# Patient Record
Sex: MALE | Race: WHITE | Employment: PART TIME | URBAN - METROPOLITAN AREA
[De-identification: names, ages, dates, MRNs, and addresses within clinical notes are randomized per-mention and may not be internally consistent; named-entity substitution may affect disease eponyms.]

---

## 2018-07-20 ENCOUNTER — HOSPITAL ENCOUNTER (EMERGENCY)
Age: 78
Discharge: HOME | End: 2018-07-20
Attending: EMERGENCY MEDICINE
Payer: MEDICARE

## 2018-07-20 ENCOUNTER — APPOINTMENT (OUTPATIENT)
Dept: GENERAL RADIOLOGY | Age: 78
End: 2018-07-20
Payer: MEDICARE

## 2018-07-20 VITALS
HEIGHT: 68 IN | WEIGHT: 183 LBS | HEART RATE: 63 BPM | RESPIRATION RATE: 14 BRPM | BODY MASS INDEX: 27.74 KG/M2 | SYSTOLIC BLOOD PRESSURE: 137 MMHG | OXYGEN SATURATION: 97 % | TEMPERATURE: 97.9 F | DIASTOLIC BLOOD PRESSURE: 72 MMHG

## 2018-07-20 DIAGNOSIS — M70.22 OLECRANON BURSITIS OF LEFT ELBOW: Primary | ICD-10-CM

## 2018-07-20 PROCEDURE — 99283 EMERGENCY DEPT VISIT LOW MDM: CPT

## 2018-07-20 PROCEDURE — 6370000000 HC RX 637 (ALT 250 FOR IP): Performed by: EMERGENCY MEDICINE

## 2018-07-20 PROCEDURE — 73080 X-RAY EXAM OF ELBOW: CPT

## 2018-07-20 RX ORDER — NAPROXEN 250 MG/1
500 TABLET ORAL ONCE
Status: COMPLETED | OUTPATIENT
Start: 2018-07-20 | End: 2018-07-20

## 2018-07-20 RX ORDER — NAPROXEN 500 MG/1
500 TABLET ORAL 2 TIMES DAILY PRN
Qty: 30 TABLET | Refills: 0 | Status: SHIPPED | OUTPATIENT
Start: 2018-07-20 | End: 2018-12-18 | Stop reason: CLARIF

## 2018-07-20 RX ADMIN — NAPROXEN 500 MG: 250 TABLET ORAL at 21:17

## 2018-07-20 ASSESSMENT — ENCOUNTER SYMPTOMS
DIARRHEA: 0
SINUS PRESSURE: 0
VOMITING: 0
EYE DISCHARGE: 0
BACK PAIN: 0
COUGH: 0
EYE PAIN: 0
EYE REDNESS: 0
ABDOMINAL PAIN: 0
SORE THROAT: 0
NAUSEA: 0
BLOOD IN STOOL: 0
RHINORRHEA: 0
WHEEZING: 0
SHORTNESS OF BREATH: 0

## 2018-07-20 ASSESSMENT — PAIN SCALES - GENERAL
PAINLEVEL_OUTOF10: 1
PAINLEVEL_OUTOF10: 1

## 2018-07-21 NOTE — ED PROVIDER NOTES
round, and reactive to light. Neck: Trachea normal and normal range of motion. Neck supple. No JVD present. Cardiovascular: Normal rate, regular rhythm and normal heart sounds. Exam reveals no gallop. No murmur heard. Pulmonary/Chest: Effort normal and breath sounds normal. No respiratory distress. He has no wheezes. He has no rales. Abdominal: Soft. Bowel sounds are normal. There is no tenderness. There is no rebound, no guarding and no CVA tenderness. Musculoskeletal: He exhibits no edema, tenderness or deformity. Left elbow: He exhibits swelling. He exhibits normal range of motion and no laceration. No tenderness found. Left elbow swelling without erythema, abrasions, or lacerations. Full passive and active left elbow range of motion. No tenderness left upper extremity. Neurological: He is alert and oriented to person, place, and time. He has normal strength. No cranial nerve deficit or sensory deficit. Skin: Skin is warm and dry. Nursing note and vitals reviewed. Procedures    MDM  Number of Diagnoses or Management Options  Olecranon bursitis of left elbow:   Diagnosis management comments: Patient's left elbow swelling was unremarkable for septic arthritis. His left elbow is wrapped with Ace-wrap. In addition, he is provided information and started on medication/Naprosyn for his condition. He is discharged in stable condition with instructions to follow-up with his primary care physician or return to ED if his symptoms worsen. --------------------------------------------- PAST HISTORY ---------------------------------------------  Past Medical History:  has a past medical history of Acute deep vein thrombosis (DVT) of distal vein of right lower extremity ([address] Acute MI, inferior wall ([address] Anxiety; Arthritis; CAD (coronary artery disease); Hip pain; Hyperlipidemia; Hypertension;  Left ventricular dysfunction; Retention of urine, unspecified; and Venous stasis of both lower extremities. Past Surgical History:  has a past surgical history that includes Diagnostic Cardiac Cath Lab Procedure (9/3/08); Coronary angioplasty (9/3/08); joint replacement (2011); Colonoscopy; Rotator cuff repair (3/1/2013); TURP (2012); skin biopsy; and Knee Arthroplasty (12/1/14). Social History:  reports that he has never smoked. He has never used smokeless tobacco. He reports that he does not drink alcohol or use drugs. Family History: family history includes Heart Attack in his father. The patients home medications have been reviewed. Allergies: Adhesive tape    -------------------------------------------------- RESULTS -------------------------------------------------  Labs:  No results found for this visit on 07/20/18. Radiology:  XR ELBOW LEFT (MIN 3 VIEWS)   Final Result   Olecranon bursa bursitis.          ------------------------- NURSING NOTES AND VITALS REVIEWED ---------------------------  Date / Time Roomed:  7/20/2018  8:12 PM  ED Bed Assignment:  21/21    The nursing notes within the ED encounter and vital signs as below have been reviewed. /72   Pulse 63   Temp 97.9 °F (36.6 °C) (Oral)   Resp 14   Ht 5' 8\" (1.727 m)   Wt 183 lb (83 kg)   SpO2 97%   BMI 27.83 kg/m²   Oxygen Saturation Interpretation: Normal      ------------------------------------------ PROGRESS NOTES ------------------------------------------  9:44 PM  I have spoken with the patient and discussed todays results, in addition to providing specific details for the plan of care and counseling regarding the diagnosis and prognosis. Their questions are answered at this time and they are agreeable with the plan. I discussed at length with them reasons for immediate return here for re evaluation. They will followup with their primary care physician by calling their office tomorrow.       --------------------------------- ADDITIONAL PROVIDER NOTES ---------------------------------  At this time the patient is without objective evidence of an acute process requiring hospitalization or inpatient management. They have remained hemodynamically stable throughout their entire ED visit and are stable for discharge with outpatient follow-up. The plan has been discussed in detail and they are aware of the specific conditions for emergent return, as well as the importance of follow-up. New Prescriptions    NAPROXEN (NAPROSYN) 500 MG TABLET    Take 1 tablet by mouth 2 times daily as needed (swelling or pain)       Diagnosis:  1. Olecranon bursitis of left elbow        Disposition:  Patient's disposition: Discharge to home  Patient's condition is stable.          Abigail Manuel DO  Resident  07/20/18 9741

## 2018-07-21 NOTE — ED PROVIDER NOTES
ED ATTENDING NOTE    I saw and examined the patient. I discussed the history and examination with the resident and agree with the plan of care         HPI: Pt presents with left elbow pain, x 1 day, constant, no radiation, pt denies f/c, ha, cp, sob, cough, ap, n/v/d. Pt denies fall or trauma. Pt is lying in bed in no acute distress. --------------------------------------------- PAST HISTORY ---------------------------------------------  Past Medical History:  has a past medical history of Acute deep vein thrombosis (DVT) of distal vein of right lower extremity ([address] Acute MI, inferior wall ([address] Anxiety; Arthritis; CAD (coronary artery disease); Hip pain; Hyperlipidemia; Hypertension; Left ventricular dysfunction; Retention of urine, unspecified; and Venous stasis of both lower extremities. Past Surgical History:  has a past surgical history that includes Diagnostic Cardiac Cath Lab Procedure (9/3/08); Coronary angioplasty (9/3/08); joint replacement (2011); Colonoscopy; Rotator cuff repair (3/1/2013); TURP (2012); skin biopsy; and Knee Arthroplasty (12/1/14). Social History:  reports that he has never smoked. He has never used smokeless tobacco. He reports that he does not drink alcohol or use drugs. Family History: family history includes Heart Attack in his father. The patients home medications have been reviewed.     Allergies: Adhesive tape    ROS: Review HPI for other details  Details in electronic record may supersede details below    Gen: no chills, fever  Eyes: no discharge, no change vision  ENT: no sore throat, no rhinitis  Cardiac: no chest pain, no palpitations  Resp: no sob, no cough  GI: no abd pain, no nausea, vomiting, or diarrhea  : no dysuria, urgency, change in color  MS: no back pain, (+) left elbow pain, no falls, no trauma   Skin: no rash, no itch  Neuro: no dizziness, headache, no focal paralysis or parasthesia  Psych: no hallucinations, no depression  Heme: no

## 2018-12-18 ENCOUNTER — OFFICE VISIT (OUTPATIENT)
Dept: CARDIOLOGY CLINIC | Age: 78
End: 2018-12-18
Payer: MEDICARE

## 2018-12-18 VITALS
DIASTOLIC BLOOD PRESSURE: 80 MMHG | HEART RATE: 61 BPM | RESPIRATION RATE: 16 BRPM | HEIGHT: 68 IN | BODY MASS INDEX: 28.19 KG/M2 | WEIGHT: 186 LBS | SYSTOLIC BLOOD PRESSURE: 132 MMHG

## 2018-12-18 DIAGNOSIS — E78.00 PURE HYPERCHOLESTEROLEMIA: ICD-10-CM

## 2018-12-18 DIAGNOSIS — Z95.5 S/P PRIMARY ANGIOPLASTY WITH CORONARY STENT: ICD-10-CM

## 2018-12-18 DIAGNOSIS — I25.2 HISTORY OF MI (MYOCARDIAL INFARCTION): ICD-10-CM

## 2018-12-18 DIAGNOSIS — I51.9 LEFT VENTRICULAR DYSFUNCTION: ICD-10-CM

## 2018-12-18 DIAGNOSIS — I25.10 CORONARY ARTERY DISEASE INVOLVING NATIVE CORONARY ARTERY OF NATIVE HEART WITHOUT ANGINA PECTORIS: Primary | ICD-10-CM

## 2018-12-18 PROCEDURE — G8484 FLU IMMUNIZE NO ADMIN: HCPCS | Performed by: INTERNAL MEDICINE

## 2018-12-18 PROCEDURE — 4040F PNEUMOC VAC/ADMIN/RCVD: CPT | Performed by: INTERNAL MEDICINE

## 2018-12-18 PROCEDURE — G8598 ASA/ANTIPLAT THER USED: HCPCS | Performed by: INTERNAL MEDICINE

## 2018-12-18 PROCEDURE — 99214 OFFICE O/P EST MOD 30 MIN: CPT | Performed by: INTERNAL MEDICINE

## 2018-12-18 PROCEDURE — 93000 ELECTROCARDIOGRAM COMPLETE: CPT | Performed by: INTERNAL MEDICINE

## 2018-12-18 PROCEDURE — G8427 DOCREV CUR MEDS BY ELIG CLIN: HCPCS | Performed by: INTERNAL MEDICINE

## 2018-12-18 PROCEDURE — 1123F ACP DISCUSS/DSCN MKR DOCD: CPT | Performed by: INTERNAL MEDICINE

## 2018-12-18 PROCEDURE — 1101F PT FALLS ASSESS-DOCD LE1/YR: CPT | Performed by: INTERNAL MEDICINE

## 2018-12-18 PROCEDURE — 1036F TOBACCO NON-USER: CPT | Performed by: INTERNAL MEDICINE

## 2018-12-18 PROCEDURE — G8419 CALC BMI OUT NRM PARAM NOF/U: HCPCS | Performed by: INTERNAL MEDICINE

## 2018-12-18 RX ORDER — ATORVASTATIN CALCIUM 20 MG/1
20 TABLET, FILM COATED ORAL EVERY EVENING
COMMUNITY

## 2018-12-18 RX ORDER — GABAPENTIN 300 MG/1
300 CAPSULE ORAL DAILY
COMMUNITY
End: 2020-01-20 | Stop reason: CLARIF

## 2018-12-18 NOTE — LETTER
To: Andrey Bravo MD [[id number]]        Re: Simi Barreto 1940          Just a note on Larry Holloway and his pending carpal tunnel surgery. He is at low risk for the planned procedure from a cardiac standpoint. (RCRI class II with 0.9% risk of major adverse cardiac event). If indicated his aspirin can be discontinued preoperatively. Please restart this medication postoperatively when bleeding risk is acceptable. Please contact me if I can be of further assistance in his management.     Sincerely,        Barb Urrutia MD

## 2019-10-04 ENCOUNTER — HOSPITAL ENCOUNTER (OUTPATIENT)
Age: 79
Discharge: HOME | End: 2019-10-06

## 2019-10-04 PROCEDURE — 88302 TISSUE EXAM BY PATHOLOGIST: CPT

## 2020-01-20 ENCOUNTER — OFFICE VISIT (OUTPATIENT)
Dept: CARDIOLOGY CLINIC | Age: 80
End: 2020-01-20
Payer: MEDICARE

## 2020-01-20 VITALS
HEIGHT: 68 IN | DIASTOLIC BLOOD PRESSURE: 70 MMHG | WEIGHT: 184 LBS | HEART RATE: 61 BPM | RESPIRATION RATE: 14 BRPM | SYSTOLIC BLOOD PRESSURE: 132 MMHG | BODY MASS INDEX: 27.89 KG/M2

## 2020-01-20 PROCEDURE — 99213 OFFICE O/P EST LOW 20 MIN: CPT | Performed by: INTERNAL MEDICINE

## 2020-01-20 PROCEDURE — G8417 CALC BMI ABV UP PARAM F/U: HCPCS | Performed by: INTERNAL MEDICINE

## 2020-01-20 PROCEDURE — 93000 ELECTROCARDIOGRAM COMPLETE: CPT | Performed by: INTERNAL MEDICINE

## 2020-01-20 PROCEDURE — 1036F TOBACCO NON-USER: CPT | Performed by: INTERNAL MEDICINE

## 2020-01-20 PROCEDURE — 1123F ACP DISCUSS/DSCN MKR DOCD: CPT | Performed by: INTERNAL MEDICINE

## 2020-01-20 PROCEDURE — 4040F PNEUMOC VAC/ADMIN/RCVD: CPT | Performed by: INTERNAL MEDICINE

## 2020-01-20 PROCEDURE — G8484 FLU IMMUNIZE NO ADMIN: HCPCS | Performed by: INTERNAL MEDICINE

## 2020-01-20 PROCEDURE — G8427 DOCREV CUR MEDS BY ELIG CLIN: HCPCS | Performed by: INTERNAL MEDICINE

## 2020-01-20 RX ORDER — LORAZEPAM 1 MG/1
1 TABLET ORAL EVERY 6 HOURS PRN
COMMUNITY
Start: 2019-10-18

## 2020-01-20 NOTE — PROGRESS NOTES
.  Patient Active Problem List   Diagnosis    History of MI (myocardial infarction) - acute inferior wall    CAD (coronary artery disease)    S/P primary angioplasty with coronary stent - RCA     Left ventricular dysfunction    Hyperlipidemia    Acute deep vein thrombosis (DVT) of distal vein of right lower extremity (HCC)    Venous stasis of both lower extremities       Current Outpatient Medications   Medication Sig Dispense Refill    LORazepam (ATIVAN) 1 MG tablet       atorvastatin (LIPITOR) 20 MG tablet Take 20 mg by mouth daily      aspirin 81 MG tablet Take 81 mg by mouth daily      allopurinol (ZYLOPRIM) 300 MG tablet Take 300 mg by mouth daily       Cholecalciferol (VITAMIN D3) 2000 UNITS CAPS Take 3,000 Units by mouth daily       carvedilol (COREG) 6.25 MG tablet Take 6.25 mg by mouth 2 times daily (with meals). Instructed to take with sip water am of procedure       No current facility-administered medications for this visit. CC:    Patient is seen in follow up for:  1. Coronary artery disease involving native coronary artery of native heart without angina pectoris    2. History of MI (myocardial infarction) - acute inferior wall    3. Left ventricular dysfunction    4. S/P primary angioplasty with coronary stent - RCA     5. Pure hypercholesterolemia        HPI:  Patient is doing well without any specific cardiac problems per history with patient and wife. Patient denies any shortness of breath, chest pain, lightheadedness or dizziness. Patient is tolerating medications well without side effects.     .    ROS:   General: No unusual weight gain, no change in exercise tolerance  Skin: No rash or itching  EENT: No vision changes or nosebleeds  Cardiovascular: No orthopnea or paroxysmal nocturnal dyspnea  Respiratory: No cough or hemoptysis  Gastrointestinal: No hematemesis or recent changes in bowel habits  Genitourinary: No hematuria, urgency or frequency  Musculoskeletal: No muscular extremities 7/14/2016       PHYSICAL EXAM:  CONSTITUTIONAL:  Well developed, well nourished    Vitals:    01/20/20 1045   BP: 132/70   Pulse: 61   Resp: 14   Weight: 184 lb (83.5 kg)   Height: 5' 8\" (1.727 m)     HEAD & FACE: Normocephalic. Symmetric. EYES: No xanthelasma. Conjunctivae not injected. EARS, NOSE, MOUTH & THROAT: Good dentition. No oral pallor or cyanosis. NECK: No JVD at 30 degrees. No thyromegaly. RESPIRATORY: Clear to auscultation and percussion in all fields. No use of accessory muscle or intercostal retractions. CARDIOVASCULAR: Regular rate and rhythm. No lifts or thrills on palpitation. Auscultation with normal S1-S2 in intensity and splitting. No carotid bruits. Abdominal aorta not enlarged. Femoral arteries without bruits. Pedal pulses 2+. No edema. ABDOMEN: Soft without hepatic or splenic enlargement. No tenderness. MUSCULOSKELETAL: No kyphosis or scoliosis of the back. Good muscle strength and tone. No muscle atrophy. Normal gait and ability to undergo exercise stress testing. EXTREMITIES: No clubbing or cyanosis. SKIN: No Xanthomas or ulcerations. NEUROLOGIC: Oriented to time, place and person. Normal mood and affect. LYMPHATIC:  No palpable neck or supraclavicular nodes. No splenomegaly. EKG: Normal sinus rhythm. No change compared to prior tracing. ASSESSMENT:                                                     ORDERS:       Diagnosis Orders   1. Coronary artery disease involving native coronary artery of native heart without angina pectoris  EKG 12 lead   2. History of MI (myocardial infarction) - acute inferior wall     3. Left ventricular dysfunction     4. S/P primary angioplasty with coronary stent - RCA      5. Pure hypercholesterolemia       Above assessment cardiac issues stable. PLAN:   See above orders.  Reviewed prior records and testing:     Obtain copy of most recent lipid profile  Discussed issues that would prompt earlier

## 2020-06-01 ENCOUNTER — HOSPITAL ENCOUNTER (OUTPATIENT)
Age: 80
Discharge: HOME | End: 2020-06-03
Payer: MEDICARE

## 2020-06-01 PROCEDURE — 82365 CALCULUS SPECTROSCOPY: CPT

## 2020-06-01 PROCEDURE — 88300 SURGICAL PATH GROSS: CPT

## 2020-06-06 LAB
CALCULI COMPOSITION: NORMAL
MASS: 149 MG
STONE DESCRIPTION: NORMAL
STONE NUMBER: 1
STONE SIZE: NORMAL MM

## 2020-07-06 ENCOUNTER — HOSPITAL ENCOUNTER (OUTPATIENT)
Age: 80
Discharge: HOME | End: 2020-07-08

## 2020-07-06 PROCEDURE — 88302 TISSUE EXAM BY PATHOLOGIST: CPT

## 2021-02-01 ENCOUNTER — OFFICE VISIT (OUTPATIENT)
Dept: CARDIOLOGY CLINIC | Age: 81
End: 2021-02-01
Payer: MEDICARE

## 2021-02-01 VITALS
HEART RATE: 71 BPM | WEIGHT: 185.3 LBS | BODY MASS INDEX: 28.08 KG/M2 | HEIGHT: 68 IN | RESPIRATION RATE: 16 BRPM | DIASTOLIC BLOOD PRESSURE: 60 MMHG | SYSTOLIC BLOOD PRESSURE: 100 MMHG

## 2021-02-01 DIAGNOSIS — I25.2 HISTORY OF MI (MYOCARDIAL INFARCTION): ICD-10-CM

## 2021-02-01 DIAGNOSIS — Z95.5 S/P PRIMARY ANGIOPLASTY WITH CORONARY STENT: ICD-10-CM

## 2021-02-01 DIAGNOSIS — I51.9 LEFT VENTRICULAR DYSFUNCTION: ICD-10-CM

## 2021-02-01 DIAGNOSIS — I25.10 CORONARY ARTERY DISEASE INVOLVING NATIVE CORONARY ARTERY OF NATIVE HEART WITHOUT ANGINA PECTORIS: Primary | ICD-10-CM

## 2021-02-01 DIAGNOSIS — E78.00 PURE HYPERCHOLESTEROLEMIA: ICD-10-CM

## 2021-02-01 PROCEDURE — G8427 DOCREV CUR MEDS BY ELIG CLIN: HCPCS | Performed by: INTERNAL MEDICINE

## 2021-02-01 PROCEDURE — G8484 FLU IMMUNIZE NO ADMIN: HCPCS | Performed by: INTERNAL MEDICINE

## 2021-02-01 PROCEDURE — 1123F ACP DISCUSS/DSCN MKR DOCD: CPT | Performed by: INTERNAL MEDICINE

## 2021-02-01 PROCEDURE — 99213 OFFICE O/P EST LOW 20 MIN: CPT | Performed by: INTERNAL MEDICINE

## 2021-02-01 PROCEDURE — 1036F TOBACCO NON-USER: CPT | Performed by: INTERNAL MEDICINE

## 2021-02-01 PROCEDURE — G8417 CALC BMI ABV UP PARAM F/U: HCPCS | Performed by: INTERNAL MEDICINE

## 2021-02-01 PROCEDURE — 93000 ELECTROCARDIOGRAM COMPLETE: CPT | Performed by: INTERNAL MEDICINE

## 2021-02-01 PROCEDURE — 4040F PNEUMOC VAC/ADMIN/RCVD: CPT | Performed by: INTERNAL MEDICINE

## 2021-02-01 NOTE — PROGRESS NOTES
.  Patient Active Problem List   Diagnosis    History of MI (myocardial infarction) - acute inferior wall    CAD (coronary artery disease)    S/P primary angioplasty with coronary stent - RCA     Left ventricular dysfunction    Hyperlipidemia    Acute deep vein thrombosis (DVT) of distal vein of right lower extremity (HCC)    Venous stasis of both lower extremities       Current Outpatient Medications   Medication Sig Dispense Refill    LORazepam (ATIVAN) 1 MG tablet       atorvastatin (LIPITOR) 20 MG tablet Take 20 mg by mouth daily      aspirin 81 MG tablet Take 81 mg by mouth daily      allopurinol (ZYLOPRIM) 300 MG tablet Take 300 mg by mouth daily       Cholecalciferol (VITAMIN D3) 75 MCG (3000 UT) TABS Take 3,000 Units by mouth daily       carvedilol (COREG) 6.25 MG tablet Take 6.25 mg by mouth 2 times daily (with meals). Instructed to take with sip water am of procedure       No current facility-administered medications for this visit. CC:    Patient is seen in follow up for:  1. Coronary artery disease involving native coronary artery of native heart without angina pectoris    2. History of MI (myocardial infarction) - acute inferior wall    3. Left ventricular dysfunction    4. S/P primary angioplasty with coronary stent - RCA     5. Pure hypercholesterolemia        HPI:  Patient is doing well without any specific cardiac problems per history with patient and wife. Patient denies any shortness of breath, chest pain, lightheadedness or dizziness. Patient is tolerating medications well without side effects.     .    ROS:   General: No unusual weight gain, no change in exercise tolerance  Skin: No rash or itching  EENT: No vision changes or nosebleeds  Cardiovascular: No orthopnea or paroxysmal nocturnal dyspnea  Respiratory: No cough or hemoptysis  Gastrointestinal: No hematemesis or recent changes in bowel habits  Genitourinary: No hematuria, urgency or frequency Musculoskeletal: No muscular weakness or joint swelling   Neurologic / Psychiatric: No incoordination or convulsions  Allergic / Immunologic/ Lymphatic / Endocrine: No anemia or bleeding tendency    Social History     Socioeconomic History    Marital status:      Spouse name: Not on file    Number of children: Not on file    Years of education: Not on file    Highest education level: Not on file   Occupational History    Not on file   Social Needs    Financial resource strain: Not on file    Food insecurity     Worry: Not on file     Inability: Not on file    Transportation needs     Medical: Not on file     Non-medical: Not on file   Tobacco Use    Smoking status: Never Smoker    Smokeless tobacco: Never Used   Substance and Sexual Activity    Alcohol use: No    Drug use: No    Sexual activity: Not on file   Lifestyle    Physical activity     Days per week: Not on file     Minutes per session: Not on file    Stress: Not on file   Relationships    Social connections     Talks on phone: Not on file     Gets together: Not on file     Attends Bahai service: Not on file     Active member of club or organization: Not on file     Attends meetings of clubs or organizations: Not on file     Relationship status: Not on file    Intimate partner violence     Fear of current or ex partner: Not on file     Emotionally abused: Not on file     Physically abused: Not on file     Forced sexual activity: Not on file   Other Topics Concern    Not on file   Social History Narrative    Not on file       Past Medical History:   Diagnosis Date    Acute deep vein thrombosis (DVT) of distal vein of right lower extremity ([facility] [address] 7/14/2016    Acute MI, inferior wall ([facility] [address] [facility] 9/3/08    Anxiety     Arthritis     osteo    CAD (coronary artery disease)     follows with Dr. Maren Arana    Hip pain     Hyperlipidemia     Hypertension     Left ventricular dysfunction     Retention of urine, unspecified  Venous stasis of both lower extremities 7/14/2016       PHYSICAL EXAM:  CONSTITUTIONAL:  Well developed, well nourished    Vitals:    02/01/21 0809   BP: 100/60   Pulse: 71   Resp: 16   Weight: 185 lb 4.8 oz (84.1 kg)   Height: 5' 8\" (1.727 m)     HEAD & FACE: Normocephalic. Symmetric. EYES: No xanthelasma. Conjunctivae not injected. EARS, NOSE, MOUTH & THROAT: Good dentition. No oral pallor or cyanosis. NECK: No JVD at 30 degrees. No thyromegaly. RESPIRATORY: Clear to auscultation and percussion in all fields. No use of accessory muscle or intercostal retractions. CARDIOVASCULAR: Regular rate and rhythm. No lifts or thrills on palpitation. Auscultation with normal S1-S2 in intensity and splitting. No carotid bruits. Abdominal aorta not enlarged. Femoral arteries without bruits. Pedal pulses 2+. No edema. ABDOMEN: Soft without hepatic or splenic enlargement. No tenderness. MUSCULOSKELETAL: No kyphosis or scoliosis of the back. Good muscle strength and tone. No muscle atrophy. Normal gait and ability to undergo exercise stress testing. EXTREMITIES: No clubbing or cyanosis. SKIN: No Xanthomas or ulcerations. NEUROLOGIC: Oriented to time, place and person. Normal mood and affect. LYMPHATIC:  No palpable neck or supraclavicular nodes. No splenomegaly. EKG: Normal sinus rhythm. No change compared to prior tracing. ASSESSMENT:                                                     ORDERS:       Diagnosis Orders   1. Coronary artery disease involving native coronary artery of native heart without angina pectoris  EKG 12 Lead   2. History of MI (myocardial infarction) - acute inferior wall     3. Left ventricular dysfunction     4. S/P primary angioplasty with coronary stent - RCA      5. Pure hypercholesterolemia       Above assessment cardiac issues stable. PLAN:   See above orders.  Reviewed prior records and testing:     Most recent lipid profile LDL 59 on 6/8/2020 Discussed issues that would prompt earlier evaluation. Follow-up office visit in 1 year.

## 2021-09-13 ENCOUNTER — HOSPITAL ENCOUNTER (OUTPATIENT)
Dept: GENERAL RADIOLOGY | Age: 81
Discharge: HOME | End: 2021-09-15
Payer: MEDICARE

## 2021-09-13 ENCOUNTER — HOSPITAL ENCOUNTER (OUTPATIENT)
Age: 81
Discharge: HOME | End: 2021-09-15
Payer: MEDICARE

## 2021-09-13 DIAGNOSIS — M15.3 POST-TRAUMATIC OSTEOARTHRITIS OF MULTIPLE JOINTS: ICD-10-CM

## 2021-09-13 PROCEDURE — 72100 X-RAY EXAM L-S SPINE 2/3 VWS: CPT

## 2022-07-14 ENCOUNTER — OFFICE VISIT (OUTPATIENT)
Dept: ORTHOPEDIC SURGERY | Age: 82
End: 2022-07-14
Payer: MEDICARE

## 2022-07-14 VITALS — WEIGHT: 185 LBS | BODY MASS INDEX: 28.04 KG/M2 | HEIGHT: 68 IN

## 2022-07-14 DIAGNOSIS — M54.2 NECK PAIN: Primary | ICD-10-CM

## 2022-07-14 DIAGNOSIS — S46.812A STRAIN OF LEFT TRAPEZIUS MUSCLE, INITIAL ENCOUNTER: ICD-10-CM

## 2022-07-14 PROCEDURE — 1123F ACP DISCUSS/DSCN MKR DOCD: CPT | Performed by: PHYSICIAN ASSISTANT

## 2022-07-14 PROCEDURE — 99203 OFFICE O/P NEW LOW 30 MIN: CPT | Performed by: PHYSICIAN ASSISTANT

## 2022-07-14 RX ORDER — TRAMADOL HYDROCHLORIDE 50 MG/1
50 TABLET ORAL EVERY 6 HOURS PRN
COMMUNITY
Start: 2022-05-18

## 2022-07-14 RX ORDER — TIZANIDINE 2 MG/1
2 TABLET ORAL NIGHTLY PRN
Qty: 7 TABLET | Refills: 0 | Status: SHIPPED
Start: 2022-07-14 | End: 2022-07-18 | Stop reason: SINTOL

## 2022-07-14 NOTE — PATIENT INSTRUCTIONS
Patient Education        Neck Pain: Care Instructions  Your Care Instructions     You can have neck pain anywhere from the bottom of your head to the top of your shoulders. It can spread to the upper back or arms. Injuries, painting a ceiling, sleeping with your neck twisted, staying in one position for too long,and many other activities can cause neck pain. Most neck pain gets better with home care. Your doctor may recommend medicine to relieve pain or relax your muscles. He or she may suggest exercise and physical therapy to increase flexibility and relieve stress. You may need towear a special (cervical) collar to support your neck for a day or two. Follow-up care is a key part of your treatment and safety. Be sure to make and go to all appointments, and call your doctor if you are having problems. It's also a good idea to know your test results and keep alist of the medicines you take. How can you care for yourself at home?  Try using a heating pad on a low or medium setting for 15 to 20 minutes every 2 or 3 hours. Try a warm shower in place of one session with the heating pad.  You can also try an ice pack for 10 to 15 minutes every 2 to 3 hours. Put a thin cloth between the ice and your skin.  Take pain medicines exactly as directed. ? If the doctor gave you a prescription medicine for pain, take it as prescribed. ? If you are not taking a prescription pain medicine, ask your doctor if you can take an over-the-counter medicine.  If your doctor recommends a cervical collar, wear it exactly as directed. When should you call for help? Call your doctor now or seek immediate medical care if:     You have new or worsening numbness in your arms, buttocks or legs.      You have new or worsening weakness in your arms or legs. (This could make it hard to stand up.)      You lose control of your bladder or bowels.    Watch closely for changes in your health, and be sure to contact your doctor if:     Your neck pain is getting worse.      You are not getting better after 1 week.      You do not get better as expected. Where can you learn more? Go to https://chpepiceweb.[email or web address]. org and sign in to your [employer] account. Enter [id number] in the [facility] box to learn more about \"Neck Pain: Care Instructions. \"     If you do not have an account, please click on the \"Sign Up Now\" link. Current as of: March 9, 2022               Content Version: 13.3  © 0883-2235 [employer]. Care instructions adapted under license by [facility] ([facility]). If you have questions about a medical condition or this instruction, always ask your healthcare professional. [address] any warranty or liability for your use of this information. Patient Education        Torticollis: Care Instructions  Your Care Instructions  Torticollis is a severe tightness of the muscles on one side of the neck. The tight muscles can make the head turn to one side, lean to one side, or bepulled forward or backward. It is also called wryneck. Your doctor asked questions about your health and examined you. You may also have had X-rays or other tests. If your doctor thinks another medical problemis causing your tight neck muscles, you may need more tests. Torticollis usually gets better with home care. Your doctor may have you take medicine to relieve pain or relax your muscles. He or she may suggest exercise and physical therapy to help increase flexibility and relieve stress. Your doctor may also have you wear a special collar, called a cervical collar, for aday or two. The collar may help make your neck more comfortable. Follow-up care is a key part of your treatment and safety. Be sure to make and go to all appointments, and call your doctor if you are having problems. It's also a good idea to know your test results and keep alist of the medicines you take.   How can you care for yourself at home?   Be safe with medicines. Read and follow all instructions on the label. ? If the doctor gave you a prescription medicine for pain, take it as prescribed. ? If you are not taking a prescription pain medicine, ask your doctor if you can take an over-the-counter medicine.  Try using a heating pad on a low or medium setting for 15 to 20 minutes every 2 or 3 hours. Try a warm shower in place of one session with the heating pad.  Try using an ice pack for 10 to 15 minutes every 2 to 3 hours. Put a thin cloth between the ice and your skin.  If your doctor recommends a cervical collar, wear it exactly as directed. When should you call for help? Call your doctor now or seek immediate medical care if:     You have new or worse numbness in your arms, buttocks, or legs.      You have new or worse weakness in your arms or legs.      Your neck pain gets worse.      You lose bladder or bowel control. Watch closely for changes in your health, and be sure to contact your doctor if:     You do not get better as expected. Where can you learn more? Go to https://IdentiapeCargoGuard.Scanadu. org and sign in to your [employer] account. Enter E750 in the [employer] box to learn more about \"Torticollis: Care Instructions. \"     If you do not have an account, please click on the \"Sign Up Now\" link. Current as of: March 9, 2022               Content Version: 13.3  © 6988-3194 [employer]. Care instructions adapted under license by [facility] ([facility]). If you have questions about a medical condition or this instruction, always ask your healthcare professional. [place] [address] any warranty or liability for your use of this information.

## 2022-07-14 NOTE — PROGRESS NOTES
[address] [facility],7Th Floor In Care  New Patient Note      CHIEF COMPLAINT:   Chief Complaint   Patient presents with    Neck Pain     Pt presents this AM with c/o L sided neck pain and L shoulder pain. States that he has noticed this pain over the past 2-3 months, but it recently worsened. States that pain begins in L suboccipitals, and extends into UT and shoulder blade. Pt also notes pain in deltoid as well. Has been taking Tylenol at home for the pain. Has been taking Tramadol for another issue as well.  Shoulder Pain       HISTORY OF PRESENT ILLNESS:                The patient is a 81 y.o. male who presents today with with complaints of left-sided neck pain that does radiate down into his left shoulder that has been present for the last 2-3 months with no known mechanism of injury. He localizes pain to the suboccipitals rating down along the trapezius and into the scapula. He denies any numbness, tingling or loss of sensation or function into the hand or fingers. Pain is worse with turning his head to the left as well as right sided tilts, sleep. He denies any difficulty or pain with shoulder range of motion. 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 He has never injured his neck in the past.  He has been taking Tylenol at home for discomfort as well as prescription tramadol.        Past Medical History:        Diagnosis Date    Acute deep vein thrombosis (DVT) of distal vein of right lower extremity ([address] 7/14/2016    Acute MI, inferior wall ([address] 9/3/08    Anxiety     Arthritis     osteo    CAD (coronary artery disease)     follows with Dr. Nick Avila    Hip pain     Hyperlipidemia     Hypertension     Left ventricular dysfunction     Retention of urine, unspecified     Venous stasis of both lower extremities 7/14/2016     Past Surgical History:        Procedure Laterality Date    COLONOSCOPY      CORONARY ANGIOPLASTY  9/3/08    PTCA/stent of the RCA 3.8 x 18 mm [place] stent     DIAGNOSTIC CARDIAC CATH LAB PROCEDURE  9/3/08    [facility]ERN[facility]  11/2019    [facility]     JOINT REPLACEMENT  2011    left knee    KNEE ARTHROPLASTY  12/1/14    right total knee     ROTATOR CUFF REPAIR  3/1/2013    right    SKIN BIOPSY      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 Allergies:  Adhesive tape    Social History:   TOBACCO:   reports that he has never smoked. He has never used smokeless tobacco.  ETOH:   reports no history of alcohol use. DRUGS:   reports no history of drug use. OCCUPATION: [employer]    Review of Systems   Constitutional: Negative for fever, chills, diaphoresis, appetite change and fatigue. HENT: Negative for dental issues, hearing loss and tinnitus. Negative for congestion, sinus pressure, sneezing, sore throat. Negative for headache. Eyes: Negative for visual disturbance, blurred and double vision. Negative for pain, discharge, redness and itching  Respiratory: Negative for cough, shortness of breath and wheezing. Cardiovascular: Negative for chest pain, palpitations and leg swelling. No dyspnea on exertion   Gastrointestinal:   Negative for nausea, vomiting, abdominal pain, diarrhea, constipation  or black or bloody. Hematologic\Lymphatic:  negative for bleeding, petechiae,   Genitourinary: Negative for hematuria and difficulty urinating. Musculoskeletal: Negative for back pain, joint swelling and gait problem. + Left-sided cervical pain  Skin: Negative for pallor, rash and wound. Neurological: Negative for dizziness, tremors, seizures, weakness, light-headedness, no TIA or stroke symptoms. No numbness and headaches. Psychiatric/Behavioral: Negative.      Physical Examination:   General appearance: alert, well appearing, and in no distress,  normal appearing weight  Mental status: alert, oriented to person, place, and time, normal mood, behavior, speech, dress, motor activity, and thought processes  Resp:   resp easy and unlabored, no audible wheezes note  Cardiac: distal pulses palpable, skin well perfused  Neurological: alert, oriented X3, normal speech, no focal findings or movement disorder noted, motor and sensory grossly normal bilaterally, normal muscle tone  HEENT: normochephalic atraumatic, external ears and eyes normal, sclera normal, neck supple  Extremities:   peripheral pulses normal, no edema, redness or tenderness in the calves   Skin: normal coloration, no rashes or open wounds, no suspicious skin lesions noted  Psych: Affect euthymic   Musculoskeletal:   Spine: On visual inspection there is no obvious deformity throughout the spine. There is no erythema, edema, ecchymosis or open wounds. There is no decreased sensation to light touch throughout the spine, bilateral UE or bilateral LE. Pt is neurovascularly intact. Patient is tender to palpation along the trapezius on the left. There is no tenderness to palpation elsewhere throughout the spine. Increased pain with neck flexion, extension, rotation, and lateral tilt to the right.  (+)Apryl     5' 8\" (1.727 m)   Wt 185 lb (83.9 kg)   BMI 28.13 kg/m²      XR: 3 view cervical spine x-rays were obtained in the clinic today which show no evidence of fracture, dislocation. The imaging will be reviewed and interpreted by Radiologist.  The report was not complete at the time of this note. Please refer to Radiologist report for their interpretation. ASSESSMENT:   Diagnosis Orders   1. Neck pain  XR CERVICAL SPINE (2-3 VIEWS)    Amb External Referral To Physical Therapy    Kim [facility], [address], [address], [address] [facility]    tiZANidine (ZANAFLEX) 2 MG tablet   2.  Strain of left trapezius muscle, initial encounter  Amb External Referral To Physical Therapy    [address] [place] [address]    tiZANidine (ZANAFLEX) 2 MG tablet PLAN:  Patient is a pleasant 81-year-old male who presents to the clinic today for evaluation of left-sided neck pain that radiates down into his scapula. This pain is been present for the last 2-3 months and he does not recall any known injury prior to the pain starting. On exam he is tender to palpation and is very tight tracing from the suboccipitals along the trapezius. He does have increased discomfort with neck flexion, extension, rotation and lateral tilt to the right. I did obtain three-view cervical spine x-rays in the clinic today which show no evidence of fracture or dislocation. I did explain to him I think this is all muscular. He is already taking Tylenol and tramadol. I did recommend Zanaflex 2 mg 1 tablet at bedtime as needed for spasm for no more than 7 days. I did advise him he should only use this at bedtime as it may make him dizzy or sleepy and he should not drive while taking it. I also recommended physical therapy. We did also discuss chiropractic care, however he is in a hold off and try physical therapy first.  I did put a referral in in case he decides that he does not want to try chiropractic care. He can use heat or ice 20 minutes on 20 minutes off whenever feels better. If he develops any numbness tingling loss of sensation or function down into his fingertips he does need to call the office emergently. Patient voiced understanding and agrees with treatment plan outlined for the office today. If he has additional questions or concerns he will call the office or return to the clinic for further evaluation. Otherwise, more than happy to see him back on an as-needed basis. Electronically signed by Lila Cameron PA-C on 7/14/22 at 8:04 AM EDT    **This report was transcribed using voice recognition software. Every effort was made to ensure accuracy; however, inadvertent computerized transcription errors may be present. **

## 2022-07-18 ENCOUNTER — OFFICE VISIT (OUTPATIENT)
Dept: NEUROSURGERY | Age: 82
End: 2022-07-18
Payer: MEDICARE

## 2022-07-18 VITALS
HEART RATE: 81 BPM | HEIGHT: 68 IN | DIASTOLIC BLOOD PRESSURE: 82 MMHG | WEIGHT: 175 LBS | BODY MASS INDEX: 26.52 KG/M2 | SYSTOLIC BLOOD PRESSURE: 138 MMHG

## 2022-07-18 DIAGNOSIS — M48.061 SPINAL STENOSIS OF LUMBAR REGION WITHOUT NEUROGENIC CLAUDICATION: Primary | ICD-10-CM

## 2022-07-18 PROCEDURE — 99203 OFFICE O/P NEW LOW 30 MIN: CPT | Performed by: PHYSICIAN ASSISTANT

## 2022-07-18 PROCEDURE — 1036F TOBACCO NON-USER: CPT | Performed by: PHYSICIAN ASSISTANT

## 2022-07-18 PROCEDURE — 1123F ACP DISCUSS/DSCN MKR DOCD: CPT | Performed by: PHYSICIAN ASSISTANT

## 2022-07-18 PROCEDURE — G8427 DOCREV CUR MEDS BY ELIG CLIN: HCPCS | Performed by: PHYSICIAN ASSISTANT

## 2022-07-18 PROCEDURE — G8417 CALC BMI ABV UP PARAM F/U: HCPCS | Performed by: PHYSICIAN ASSISTANT

## 2022-07-18 RX ORDER — GABAPENTIN 300 MG/1
300 CAPSULE ORAL 3 TIMES DAILY
Qty: 90 CAPSULE | Refills: 3 | Status: SHIPPED | OUTPATIENT
Start: 2022-07-18 | End: 2022-09-21

## 2022-07-18 ASSESSMENT — ENCOUNTER SYMPTOMS
RESPIRATORY NEGATIVE: 1
BACK PAIN: 1
ALLERGIC/IMMUNOLOGIC NEGATIVE: 1
GASTROINTESTINAL NEGATIVE: 1
EYES NEGATIVE: 1

## 2022-07-18 NOTE — PROGRESS NOTES
Subjective:      Patient ID: Keisha Keene is a 81 y.o. male. Back Pain  This is a new problem. Episode onset: 8 months. The pain is present in the lumbar spine (and left > right leg pain into the feet. ). The pain is at a severity of 8/10. The symptoms are aggravated by twisting, standing and bending. Treatments tried: tramadol, tylenol, FANNIE. The treatment provided mild relief. Review of Systems   Constitutional: Negative. HENT: Negative. Eyes: Negative. Respiratory: Negative. Cardiovascular: Negative. Gastrointestinal: Negative. Endocrine: Negative. Genitourinary: Negative. Musculoskeletal:  Positive for back pain. Skin: Negative. Allergic/Immunologic: Negative. Neurological: Negative. Hematological: Negative. Psychiatric/Behavioral: Negative. Objective:   Physical Exam  Constitutional:       Appearance: Normal appearance. HENT:      Head: Normocephalic and atraumatic. Nose: Nose normal.   Eyes:      Pupils: Pupils are equal, round, and reactive to light. Pulmonary:      Effort: Pulmonary effort is normal.   Abdominal:      General: There is no distension. Skin:     General: Skin is warm and dry. Neurological:      Mental Status: He is alert. GCS: GCS eye subscore is 4. GCS verbal subscore is 5. GCS motor subscore is 6. Cranial Nerves: Cranial nerves are intact. Sensory: Sensation is intact. Motor: Motor function is intact. Gait: Gait abnormal.      Deep Tendon Reflexes:      Reflex Scores:       Patellar reflexes are 0 on the right side and 0 on the left side. Achilles reflexes are 1+ on the right side and 1+ on the left side. Psychiatric:         Mood and Affect: Mood normal.       Assessment:      81year old male with progressive left > right leg pain into the feet. Lumbar MRI reveals severe L3-4, and L4-5 stenosis, normal alignment. He has had no relief with PT, FANNIE, and NSAIDS. Plan:       We will order

## 2022-09-21 ENCOUNTER — OFFICE VISIT (OUTPATIENT)
Dept: CARDIOLOGY CLINIC | Age: 82
End: 2022-09-21
Payer: MEDICARE

## 2022-09-21 VITALS
HEIGHT: 68 IN | BODY MASS INDEX: 26.52 KG/M2 | DIASTOLIC BLOOD PRESSURE: 70 MMHG | WEIGHT: 175 LBS | HEART RATE: 73 BPM | RESPIRATION RATE: 12 BRPM | SYSTOLIC BLOOD PRESSURE: 142 MMHG

## 2022-09-21 DIAGNOSIS — I25.2 HISTORY OF MI (MYOCARDIAL INFARCTION): ICD-10-CM

## 2022-09-21 DIAGNOSIS — I51.9 LEFT VENTRICULAR DYSFUNCTION: ICD-10-CM

## 2022-09-21 DIAGNOSIS — I25.10 CORONARY ARTERY DISEASE INVOLVING NATIVE CORONARY ARTERY OF NATIVE HEART WITHOUT ANGINA PECTORIS: Primary | ICD-10-CM

## 2022-09-21 DIAGNOSIS — E78.00 PURE HYPERCHOLESTEROLEMIA: ICD-10-CM

## 2022-09-21 DIAGNOSIS — Z95.5 S/P PRIMARY ANGIOPLASTY WITH CORONARY STENT: ICD-10-CM

## 2022-09-21 PROCEDURE — 1036F TOBACCO NON-USER: CPT | Performed by: INTERNAL MEDICINE

## 2022-09-21 PROCEDURE — 1123F ACP DISCUSS/DSCN MKR DOCD: CPT | Performed by: INTERNAL MEDICINE

## 2022-09-21 PROCEDURE — G8417 CALC BMI ABV UP PARAM F/U: HCPCS | Performed by: INTERNAL MEDICINE

## 2022-09-21 PROCEDURE — G8427 DOCREV CUR MEDS BY ELIG CLIN: HCPCS | Performed by: INTERNAL MEDICINE

## 2022-09-21 PROCEDURE — 99213 OFFICE O/P EST LOW 20 MIN: CPT | Performed by: INTERNAL MEDICINE

## 2022-09-21 PROCEDURE — 93000 ELECTROCARDIOGRAM COMPLETE: CPT | Performed by: INTERNAL MEDICINE

## 2022-09-21 RX ORDER — METHYLPREDNISOLONE 4 MG/1
TABLET ORAL
COMMUNITY
Start: 2022-08-05 | End: 2022-11-03

## 2022-09-21 NOTE — PROGRESS NOTES
.  Patient Active Problem List   Diagnosis    History of MI (myocardial infarction) - acute inferior wall    CAD (coronary artery disease)    S/P primary angioplasty with coronary stent - RCA     Left ventricular dysfunction    Hyperlipidemia    Acute deep vein thrombosis (DVT) of distal vein of right lower extremity (HCC)    Venous stasis of both lower extremities       Current Outpatient Medications   Medication Sig Dispense Refill    methylPREDNISolone (MEDROL DOSEPACK) 4 MG tablet       gabapentin (NEURONTIN) 300 MG capsule Take 1 capsule by mouth in the morning and 1 capsule at noon and 1 capsule before bedtime. Do all this for 30 days. 90 capsule 3    traMADol (ULTRAM) 50 MG tablet       LORazepam (ATIVAN) 1 MG tablet       atorvastatin (LIPITOR) 20 MG tablet Take 20 mg by mouth in the morning. aspirin 81 MG tablet Take 81 mg by mouth daily      allopurinol (ZYLOPRIM) 300 MG tablet Take 300 mg by mouth daily       Cholecalciferol (VITAMIN D3) 75 MCG (3000 UT) TABS Take 3,000 Units by mouth daily       carvedilol (COREG) 6.25 MG tablet Take 6.25 mg by mouth 2 times daily (with meals). Instructed to take with sip water am of procedure       No current facility-administered medications for this visit. CC:    Patient is seen in follow up for:  1. Coronary artery disease involving native coronary artery of native heart without angina pectoris    2. History of MI (myocardial infarction) - acute inferior wall    3. Left ventricular dysfunction    4. S/P primary angioplasty with coronary stent - RCA     5. Pure hypercholesterolemia        HPI:  Patient is doing well without any specific cardiac problems. Patient denies any shortness of breath, chest pain, lightheadedness or dizziness. Patient is tolerating medications well without side effects. Considering surgery for lumbar spinal stenosis with neurogenic claudication.     .    ROS:   General: No unusual weight gain, no change in exercise tolerance  Skin: No rash or itching  EENT: No vision changes or nosebleeds  Cardiovascular: No orthopnea or paroxysmal nocturnal dyspnea  Respiratory: No cough or hemoptysis  Gastrointestinal: No hematemesis or recent changes in bowel habits  Genitourinary: No hematuria, urgency or frequency  Musculoskeletal: No muscular weakness or joint swelling   Neurologic / Psychiatric: No incoordination or convulsions  Allergic / Immunologic/ Lymphatic / Endocrine: No anemia or bleeding tendency    Social History     Socioeconomic History    Marital status:      Spouse name: Not on file    Number of children: Not on file    Years of education: Not on file    Highest education level: Not on file   Occupational History    Not on file   Tobacco Use    Smoking status: Never    Smokeless tobacco: Never   Substance and Sexual Activity    Alcohol use: No    Drug use: No    Sexual activity: Not on file   Other Topics Concern    Not on file   Social History Narrative    Not on file     Social Determinants of Health     Financial Resource Strain: Not on file   Food Insecurity: Not on file   Transportation Needs: Not on file   Physical Activity: Not on file   Stress: Not on file   Social Connections: Not on file   Intimate Partner Violence: Not on file   Housing Stability: Not on file       Past Medical History:   Diagnosis Date    Acute deep vein thrombosis (DVT) of distal vein of right lower extremity ([facility] 7/14/2016    Acute MI, inferior wall ([facility] 9/3/08    Anxiety     Arthritis     osteo    CAD (coronary artery disease)     follows with Dr. Sherri Olmstead    Hip pain     Hyperlipidemia     Hypertension     Left ventricular dysfunction     Retention of urine, unspecified     Venous stasis of both lower extremities 7/14/2016       PHYSICAL EXAM:  CONSTITUTIONAL:  Well developed, well nourished    Vitals:    09/21/22 0751   BP: (!) 142/70   Pulse: 73   Resp: 12   Weight: 175 lb (79.4 kg)   Height: 5' 8\" (1.727 m)     HEAD & FACE: Normocephalic. Symmetric. EYES: No xanthelasma. Conjunctivae not injected. EARS, NOSE, MOUTH & THROAT: Good dentition. No oral pallor or cyanosis. NECK: No JVD at 30 degrees. No thyromegaly. RESPIRATORY: Clear to auscultation and percussion in all fields. No use of accessory muscle or intercostal retractions. CARDIOVASCULAR: Regular rate and rhythm. No lifts or thrills on palpitation. Auscultation with normal S1-S2 in intensity and splitting. No carotid bruits. Abdominal aorta not enlarged. Femoral arteries without bruits. Pedal pulses 2+. No edema. ABDOMEN: Soft without hepatic or splenic enlargement. No tenderness. MUSCULOSKELETAL: No kyphosis or scoliosis of the back. Good muscle strength and tone. No muscle atrophy. Normal gait and ability to undergo exercise stress testing. EXTREMITIES: No clubbing or cyanosis. SKIN: No Xanthomas or ulcerations. NEUROLOGIC: Oriented to time, place and person. Normal mood and affect. LYMPHATIC:  No palpable neck or supraclavicular nodes. No splenomegaly. EKG: Normal sinus rhythm. Poor septal R wave progression. QTc 436 ms. No change compared to prior tracing. ASSESSMENT:                                                     ORDERS:       Diagnosis Orders   1. Coronary artery disease involving native coronary artery of native heart without angina pectoris  EKG 12 lead      2. History of MI (myocardial infarction) - acute inferior wall  EKG 12 lead      3. Left ventricular dysfunction        4. S/P primary angioplasty with coronary stent - RCA   EKG 12 lead      5. Pure hypercholesterolemia          Above assessment cardiac issues stable. PLAN:   See above orders. Reviewed prior records and testing:     Most recent lipid profile LDL 59 on 6/8/2020  Discussed issues that would prompt earlier evaluation. Follow-up office visit in 1 year. Consider stress testing if patient decides to proceed with spinal surgery.

## 2022-10-12 ENCOUNTER — TELEPHONE (OUTPATIENT)
Dept: CARDIOLOGY CLINIC | Age: 82
End: 2022-10-12

## 2022-10-12 NOTE — TELEPHONE ENCOUNTER
Patient called and stated he is going to move forward with surgery.   He needs stress testing,  Please advise on what stress test to order

## 2022-10-13 DIAGNOSIS — Z01.818 PRE-OP TESTING: Primary | ICD-10-CM

## 2022-10-13 DIAGNOSIS — I25.10 CORONARY ARTERY DISEASE INVOLVING NATIVE CORONARY ARTERY OF NATIVE HEART WITHOUT ANGINA PECTORIS: ICD-10-CM

## 2022-10-17 ENCOUNTER — TELEPHONE (OUTPATIENT)
Dept: CARDIOLOGY | Age: 82
End: 2022-10-17

## 2022-10-17 NOTE — TELEPHONE ENCOUNTER
Left message on voice mail to remind patient of pharmacological  stress test appointment on 10/19/2022 at 0900. Instructions for test,given, and COVID-19 preprocedure information left on voice mail. Asked patient to call with any questions or if unable to keep appointment.

## 2022-10-19 ENCOUNTER — HOSPITAL ENCOUNTER (OUTPATIENT)
Dept: CARDIOLOGY | Age: 82
Discharge: HOME | End: 2022-10-19
Payer: MEDICARE

## 2022-10-19 VITALS
WEIGHT: 175 LBS | HEART RATE: 60 BPM | SYSTOLIC BLOOD PRESSURE: 130 MMHG | HEIGHT: 68 IN | RESPIRATION RATE: 18 BRPM | DIASTOLIC BLOOD PRESSURE: 70 MMHG | BODY MASS INDEX: 26.52 KG/M2

## 2022-10-19 DIAGNOSIS — I25.10 CORONARY ARTERY DISEASE INVOLVING NATIVE CORONARY ARTERY OF NATIVE HEART WITHOUT ANGINA PECTORIS: ICD-10-CM

## 2022-10-19 DIAGNOSIS — Z01.818 PRE-OP TESTING: ICD-10-CM

## 2022-10-19 PROCEDURE — 2580000003 HC RX 258: Performed by: INTERNAL MEDICINE

## 2022-10-19 PROCEDURE — 93017 CV STRESS TEST TRACING ONLY: CPT

## 2022-10-19 PROCEDURE — A9500 TC99M SESTAMIBI: HCPCS | Performed by: INTERNAL MEDICINE

## 2022-10-19 PROCEDURE — 6360000002 HC RX W HCPCS: Performed by: INTERNAL MEDICINE

## 2022-10-19 PROCEDURE — 78452 HT MUSCLE IMAGE SPECT MULT: CPT

## 2022-10-19 PROCEDURE — 3430000000 HC RX DIAGNOSTIC RADIOPHARMACEUTICAL: Performed by: INTERNAL MEDICINE

## 2022-10-19 RX ORDER — SODIUM CHLORIDE 9 MG/ML
INJECTION, SOLUTION INTRAVENOUS PRN
Status: DISCONTINUED | OUTPATIENT
Start: 2022-10-19 | End: 2022-10-20 | Stop reason: HOSPADM

## 2022-10-19 RX ORDER — SODIUM CHLORIDE 0.9 % (FLUSH) 0.9 %
10 SYRINGE (ML) INJECTION PRN
Status: DISCONTINUED | OUTPATIENT
Start: 2022-10-19 | End: 2022-10-20 | Stop reason: HOSPADM

## 2022-10-19 RX ADMIN — SODIUM CHLORIDE, PRESERVATIVE FREE 10 ML: 5 INJECTION INTRAVENOUS at 10:54

## 2022-10-19 RX ADMIN — REGADENOSON 0.4 MG: 0.08 INJECTION, SOLUTION INTRAVENOUS at 10:54

## 2022-10-19 RX ADMIN — Medication 10 MILLICURIE: at 08:57

## 2022-10-19 RX ADMIN — SODIUM CHLORIDE, PRESERVATIVE FREE 10 ML: 5 INJECTION INTRAVENOUS at 08:58

## 2022-10-19 RX ADMIN — SODIUM CHLORIDE, PRESERVATIVE FREE 10 ML: 5 INJECTION INTRAVENOUS at 10:55

## 2022-10-19 RX ADMIN — Medication 31.6 MILLICURIE: at 10:54

## 2022-10-19 NOTE — PROCEDURES
[address] and Vascular [address] [facility]   [address] [facility] [address], [address]  [phone]                Pharmacologic Stress Nuclear Gated SPECT Study    Name: [address] Account Number: [de-identified]    :  1940          Sex: male         Date of Study:  10/19/2022    Height: 5' 8\" (172.7 cm)         Weight: 175 lb (79.4 kg)     Ordering Provider: Ruddy Del Rosario          PCP: Jay Osorio MD      Cardiologist: Ruddy Del Rosario             Interpreting Physician: Rylee Leahy  _________________________________________________________________________________    Indication:   Preoperative Risk Stratification patient for Back surgery     Clinical History:   Patient has prior history of coronary artery disease. Resting ECG:    Normal sinus rhythm and IVCD    Procedure:   Pharmacologic stress testing was performed with regadenoson 0.4 mg for 15 seconds. The heart rate was 60 at baseline and navin to 85 beats during the infusion. This corresponds to 61% of maximum predicted heart rate. The blood pressure at baseline was 130/70 and blood pressure at the end of infusion was 128/70. Blood pressure response was normal during the stress procedure. The patient tolerated the infusion well. ECG during the infusion did not change. IMAGING: Myocardial perfusion imaging was performed at rest 30-35 minutes following the intravenous injection of 10.0 mCi of (Tc-Sestamibi) followed by 10 ml of Normal Saline. As per infusion protocol, the patient was injected intravenously with 31.6 mCi of (Tc-Sestamibi) followed by 10 ml of Normal Saline. Gated post-stress tomographic imaging was performed 20-25 minutes after stress. FINDINGS: The overall quality of the study was fair. Left ventricular cavity size was noted to be enlarged on both rest and stress studies. Rotational analog analysis demonstrated soft tissue diaphragmatic attenuation.     A severe defect was present in the  inferior  wall that was  large sized by quantification. There also was a moderate defect present in the apex wall that was  moderate sized by quantification:     The resting images reveal partial reversibility. Gated SPECT left ventricular ejection fraction was calculated to be 45%, with hypokinesis of the inferior wall. Impression:    ECG during the infusion did not change. The myocardial perfusion imaging was abnormal.    The abnormality was a a large sized fixed defect in the inferior wall suggestive of a prior MI and a moderate sized partially reversible defect in the apex wall  Overall left ventricular systolic function was abnormal with regional wall motion abnormalities. Intermediate risk general pharmacologic stress test.    Thank you for sending your patient to this [employer].      Electronically signed by Noram Watts DO on 10/19/22 at 12:17 PM EDT

## 2022-10-19 NOTE — DISCHARGE INSTRUCTIONS
[address] and V[facility] Lab      Instructions to Patients    The following are the instructions for patients who have had a procedure in our office today. Patient name: Emmie Montenegro    Radionuclide Activity: 40mCi of 99mTc-Sestamibi    Date Administered: 10/19/2022    Expires: 48 hours after scheduled appointment time      Patient may resume normal activity unless otherwise instructed. Patient may resume medications as normal.  If the need should arise, patient may call [phone] between the hours of 8:00am-5:00pm.  After hours there is at least one physician on-call at all times for those patients needing assistance. Patients may call [phone] and the answering service will direct the patient to one of our physicians for assistance. After the patient's test if they are going to be leaving from an airport in the near future they should take this letter with them to verify the test and radionuclide used for their test.      This letter verifies that the above named bearer received an injection of a radionuclide for medical purpose/usage only.         Electronically signed by Georgette Morales on 10/19/2022 at 10:48 AM

## 2022-10-20 ENCOUNTER — TELEPHONE (OUTPATIENT)
Dept: CARDIOLOGY CLINIC | Age: 82
End: 2022-10-20

## 2022-10-20 NOTE — TELEPHONE ENCOUNTER
----- Message from Tramaine Caldwell MD sent at 10/19/2022  1:37 PM EDT -----  Please let Cande Malcolm know that stress test looked okay. If he decides to proceed with back surgery please let us know the name of his surgeon so we can dictate a letter to him.

## 2022-11-02 ENCOUNTER — HOSPITAL ENCOUNTER (OUTPATIENT)
Age: 82
Discharge: HOME | End: 2022-11-04

## 2022-11-02 PROCEDURE — 87081 CULTURE SCREEN ONLY: CPT

## 2022-11-03 VITALS — HEIGHT: 68 IN | BODY MASS INDEX: 26.98 KG/M2 | WEIGHT: 178 LBS

## 2022-11-03 NOTE — PROGRESS NOTES
[address]   PRE-ADMISSION TESTING GENERAL INSTRUCTIONS  [facility] Phone Number: [phone]      GENERAL INSTRUCTIONS:    [x] Antibacterial Soap Shower Night before surgery. [x] CHG Wipes instruction sheet and wipes given. [x] Do not wear lotions, powders, deodorant. [x] Nothing by mouth after midnight; including gum, candy, mints, or water. [x] You may brush your teeth, gargle, but do NOT swallow water. [x] No tobacco products, illegal drugs, or alcohol within 24 hours of your surgery. [x] Jewelry or valuables should not be brought to the hospital. All body and/or tongue piercing's must be removed prior to arriving to hospital. No contact lens or hair pins. [x] Bring insurance card and photo ID. [x] Bring copy of living will or healthcare power of  papers to be placed in your electronic record. [x] Transfusion Bracelet: Please bring with you to hospital, day of surgery. PARKING INSTRUCTIONS:     [x] ARRIVAL DATE & TIME: 11/9/22 @ 9:30AM  [x] Enter into the [employer]. Two people may accompany you. Masks are not required but are recommended. [x] Parking Lot \"I\" is where you will park. It is located on the corner of [facility] and [facility]. The entrance is on [facility]. Upon entering the parking lot, a voucher ticket will print    EDUCATION INSTRUCTIONS:        [x] Pre-admission Testing educational folder given  [x] Incentive Spirometry,coughing & deep breathing exercises reviewed. [x] Medication information sheet(s)   [x] Fluoroscopy-Xray used in surgery reviewed with patient. Educational pamphlet placed in chart. [x] Pain: Post-op pain is normal and to be expected. You will be asked to rate your pain from 0-10. MEDICATION INSTRUCTIONS:    [x] Bring a complete list of your medications, please write the last time you took the medicine, give this list to the nurse in Pre-Op.     [x] Take only the following medications the morning of surgery with 1-2 ounces of water: gabapentin (Neurontin), carvedilol (Coreg) and may take Ativan (if needed). [x] Stop all herbal supplements and vitamins 5 days before surgery. Stop NSAIDS 7 days before surgery. [x] Use your inhalers the morning of surgery. Bring your emergency inhaler with you day of surgery. [x] Follow physician instructions regarding any blood thinners you may be taking. Aspirin: LD 11/1/22. Pt already started holding. WHAT TO EXPECT:    [x] The day of surgery you will be greeted and checked in by the [employer].  In addition, you will be registered in the [facility] by a Patient Access Representative. Please bring your photo ID and insurance card. A nurse will greet you in accordance to the time you are needed in the pre-op area to prepare you for surgery. Please do not be discouraged if you are not greeted in the order you arrive as there are many variables that are involved in patient preparation. Your patience is greatly appreciated as you wait for your nurse. Please bring in items such as: books, magazines, newspapers, electronics, or any other items  to occupy your time in the waiting area. [x]  Delays may occur with surgery and staff will make a sincere effort to keep you informed of delays. If any delays occur with your procedure, we apologize ahead of time for your inconvenience as we recognize the value of your time.

## 2022-11-04 LAB — MRSA CULTURE ONLY: NORMAL

## 2022-11-07 ENCOUNTER — HOSPITAL ENCOUNTER (OUTPATIENT)
Dept: PREADMISSION TESTING | Age: 82
Discharge: HOME | End: 2022-11-07

## 2022-11-07 ENCOUNTER — PREP FOR PROCEDURE (OUTPATIENT)
Dept: ORTHOPEDIC SURGERY | Age: 82
End: 2022-11-07

## 2022-11-07 DIAGNOSIS — Z01.818 PRE-OP EXAM: Primary | ICD-10-CM

## 2022-11-07 DIAGNOSIS — I25.2 MYOCARDIAL INFARCT, OLD: ICD-10-CM

## 2022-11-07 DIAGNOSIS — I25.10 CORONARY ARTERIOSCLEROSIS: ICD-10-CM

## 2022-11-07 PROBLEM — M48.062 SPINAL STENOSIS, LUMBAR REGION, WITH NEUROGENIC CLAUDICATION: Status: ACTIVE | Noted: 2022-11-03

## 2022-11-07 RX ORDER — ACETAMINOPHEN 325 MG/1
1000 TABLET ORAL ONCE
Status: CANCELLED | OUTPATIENT
Start: 2022-11-07 | End: 2022-11-07

## 2022-11-07 RX ORDER — SODIUM CHLORIDE 0.9 % (FLUSH) 0.9 %
5-40 SYRINGE (ML) INJECTION PRN
Status: CANCELLED | OUTPATIENT
Start: 2022-11-07

## 2022-11-07 RX ORDER — SODIUM CHLORIDE 9 MG/ML
INJECTION, SOLUTION INTRAVENOUS PRN
Status: CANCELLED | OUTPATIENT
Start: 2022-11-07

## 2022-11-07 RX ORDER — SODIUM CHLORIDE 0.9 % (FLUSH) 0.9 %
5-40 SYRINGE (ML) INJECTION EVERY 12 HOURS SCHEDULED
Status: CANCELLED | OUTPATIENT
Start: 2022-11-07

## 2022-11-07 NOTE — H&P (VIEW-ONLY)
CC: Lumbar Stenosis    HPI: Patient presents today for surgical discussion. He states he is now his presurgical clearances and is ready to proceed with surgery. He presents today with his wife. They state they had many questions about surgery. He states his pain has not changed. His pain does get severe he states it is approximately 9 out of 10 at most times. Pain continues to be severely aggravated by activities or standing/walking for any amount of time. He and his wife both state they would like to proceed with surgery. Review of systems, past medical history, social history and family history  The Patient's past medical history, family history, social history,  surgical history, as well as current medications and allergies were  reviewed. A 12 point review of systems was reviewed. Results were  negative except for that noted in the HPI. -Please refer in EMR chart and/or intake forms. The information was  personally reviewed. General appearance: Well-developed, well-nourished, no apparent distress  Neuro psych: Mood is pleasant. Affect is benign  Heent: Extraocular motion grossly intact, thyroid midline  Cardiovascular: Extremities warm and perfused, pulses palpable  Respiration: Symmetric chest expansion, no increased work of breathing, no audible wheezing  Skin: Head, neck, trunk, and extremities are dry, intact, and without  draining lesions  Lymphatics: Nodes in cervical areas no palpable      Musculoskeletal:  Stands and ambulates with forward lean and short strides  Right TA and EHL 4 out of 5 compared to left. Sensation slightly diminished in the left L4 and L5. No calf tenderness  Palpable distal pulses      ASSESSMENT/PLAN:  Lumbar spinal stenosis with neurogenic claudication  Spondylolisthesis L4-5  Early degenerative scoliosis L5-S1    We again discussed surgical options in detail. I again discussed laminectomy would be an option from L3-S1.   I would worry that the amount of joint resection along with his mobile L4-5 spondylolisthesis will put him at risk for increased instability in need of a second surgery in the future. We did discuss this is not definite and he could do well with laminectomy alone. We also discussed TLIF L3-S1. We discussed risk associated with both surgeries in detail and answered all questions. We also discussed likely perioperative and postoperative courses with both surgeries in detail and answered all questions. Both he and his wife would prefer fusion surgery so that decompression can be appropriately done without worry of further destabilization. I agree with their decision. We will proceed with scheduling surgery. We also discussed bracing for him as this would help to reduce his pain by restricting mobility of the trunk. He would likely benefit from this prior surgery as well as postoperatively. We will get him fitted for an LSO brace today.

## 2022-11-07 NOTE — H&P
Patient presents today for surgical discussion. He states he is now his presurgical clearances and is ready to proceed with surgery. He presents today with his wife. They state they had many questions about surgery. He states his pain has not changed. His pain does get severe he states it is approximately 9 out of 10 at most times. Pain continues to be severely aggravated by activities or standing/walking for any amount of time. He and his wife both state they would like to proceed with surgery. Review of systems, past medical history, social history and family history  The Patient's past medical history, family history, social history,  surgical history, as well as current medications and allergies were  reviewed. A 12 point review of systems was reviewed. Results were  negative except for that noted in the HPI. -Please refer in EMR chart and/or intake forms. The information was  personally reviewed. General appearance: Well-developed, well-nourished, no apparent distress  Neuro psych: Mood is pleasant. Affect is benign  Heent: Extraocular motion grossly intact, thyroid midline  Cardiovascular: Extremities warm and perfused, pulses palpable  Respiration: Symmetric chest expansion, no increased work of breathing, no audible wheezing  Skin: Head, neck, trunk, and extremities are dry, intact, and without  draining lesions  Lymphatics: Nodes in cervical areas no palpable      Musculoskeletal:  Stands and ambulates with forward lean and short strides  Right TA and EHL 4 out of 5 compared to left. Sensation slightly diminished in the left L4 and L5. No calf tenderness  Palpable distal pulses      ASSESSMENT/PLAN:  Lumbar spinal stenosis with neurogenic claudication  Spondylolisthesis L4-5  Early degenerative scoliosis L5-S1    We again discussed surgical options in detail. I again discussed laminectomy would be an option from L3-S1.   I would worry that the amount of joint resection along with his mobile L4-5 spondylolisthesis will put him at risk for increased instability in need of a second surgery in the future. We did discuss this is not definite and he could do well with laminectomy alone. We also discussed TLIF L3-S1. We discussed risk associated with both surgeries in detail and answered all questions. We also discussed likely perioperative and postoperative courses with both surgeries in detail and answered all questions. Both he and his wife would prefer fusion surgery so that decompression can be appropriately done without worry of further destabilization. I agree with their decision. We will proceed with scheduling surgery. We also discussed bracing for him as this would help to reduce his pain by restricting mobility of the trunk. He would likely benefit from this prior surgery as well as postoperatively. We will get him fitted for an LSO brace today.

## 2022-11-08 ENCOUNTER — HOSPITAL ENCOUNTER (OUTPATIENT)
Dept: PREADMISSION TESTING | Age: 82
Setting detail: SURGERY ADMIT
Discharge: HOME | DRG: 457 | End: 2022-11-08
Payer: MEDICARE

## 2022-11-08 VITALS
RESPIRATION RATE: 16 BRPM | HEIGHT: 68 IN | WEIGHT: 174 LBS | BODY MASS INDEX: 26.37 KG/M2 | SYSTOLIC BLOOD PRESSURE: 126 MMHG | TEMPERATURE: 97.6 F | OXYGEN SATURATION: 99 % | HEART RATE: 68 BPM | DIASTOLIC BLOOD PRESSURE: 63 MMHG

## 2022-11-08 DIAGNOSIS — Z01.818 PRE-OP EXAM: ICD-10-CM

## 2022-11-08 DIAGNOSIS — I25.2 MYOCARDIAL INFARCT, OLD: ICD-10-CM

## 2022-11-08 DIAGNOSIS — I25.10 CORONARY ARTERIOSCLEROSIS: ICD-10-CM

## 2022-11-08 LAB
ABO/RH: NORMAL
ANTIBODY SCREEN: NORMAL
APTT: 31.7 SEC (ref 24.5–35.1)
HCT VFR BLD CALC: 37.7 % (ref 37–54)
HEMOGLOBIN: 12 G/DL (ref 12.5–16.5)
INR BLD: 1.1
MCH RBC QN AUTO: 27.5 PG (ref 26–35)
MCHC RBC AUTO-ENTMCNC: 31.8 % (ref 32–34.5)
MCV RBC AUTO: 86.5 FL (ref 80–99.9)
PDW BLD-RTO: 15.9 FL (ref 11.5–15)
PLATELET # BLD: 181 E9/L (ref 130–450)
PMV BLD AUTO: 10.2 FL (ref 7–12)
PROTHROMBIN TIME: 12.5 SEC (ref 9.3–12.4)
RBC # BLD: 4.36 E12/L (ref 3.8–5.8)
WBC # BLD: 4.7 E9/L (ref 4.5–11.5)

## 2022-11-08 PROCEDURE — 85027 COMPLETE CBC AUTOMATED: CPT

## 2022-11-08 PROCEDURE — 36415 COLL VENOUS BLD VENIPUNCTURE: CPT

## 2022-11-08 PROCEDURE — 86850 RBC ANTIBODY SCREEN: CPT

## 2022-11-08 PROCEDURE — 86900 BLOOD TYPING SEROLOGIC ABO: CPT

## 2022-11-08 PROCEDURE — 85730 THROMBOPLASTIN TIME PARTIAL: CPT

## 2022-11-08 PROCEDURE — 85610 PROTHROMBIN TIME: CPT

## 2022-11-08 PROCEDURE — 86901 BLOOD TYPING SEROLOGIC RH(D): CPT

## 2022-11-09 ENCOUNTER — APPOINTMENT (OUTPATIENT)
Dept: GENERAL RADIOLOGY | Age: 82
DRG: 457 | End: 2022-11-09
Attending: ORTHOPAEDIC SURGERY
Payer: MEDICARE

## 2022-11-09 ENCOUNTER — ANESTHESIA (OUTPATIENT)
Dept: OPERATING ROOM | Age: 82
DRG: 457 | End: 2022-11-09
Payer: MEDICARE

## 2022-11-09 ENCOUNTER — ANESTHESIA EVENT (OUTPATIENT)
Dept: OPERATING ROOM | Age: 82
DRG: 457 | End: 2022-11-09
Payer: MEDICARE

## 2022-11-09 ENCOUNTER — HOSPITAL ENCOUNTER (INPATIENT)
Age: 82
LOS: 4 days | Discharge: HOME HEALTH | DRG: 457 | End: 2022-11-13
Attending: ORTHOPAEDIC SURGERY | Admitting: ORTHOPAEDIC SURGERY
Payer: MEDICARE

## 2022-11-09 DIAGNOSIS — M43.27 FUSION OF SPINE OF LUMBOSACRAL REGION: Primary | ICD-10-CM

## 2022-11-09 DIAGNOSIS — M48.062 SPINAL STENOSIS, LUMBAR REGION, WITH NEUROGENIC CLAUDICATION: ICD-10-CM

## 2022-11-09 PROCEDURE — 2720000010 HC SURG SUPPLY STERILE: Performed by: ORTHOPAEDIC SURGERY

## 2022-11-09 PROCEDURE — 95910 NRV CNDJ TEST 7-8 STUDIES: CPT | Performed by: AUDIOLOGIST

## 2022-11-09 PROCEDURE — 3600000015 HC SURGERY LEVEL 5 ADDTL 15MIN: Performed by: ORTHOPAEDIC SURGERY

## 2022-11-09 PROCEDURE — 6370000000 HC RX 637 (ALT 250 FOR IP): Performed by: ORTHOPAEDIC SURGERY

## 2022-11-09 PROCEDURE — 6360000002 HC RX W HCPCS

## 2022-11-09 PROCEDURE — 2709999900 HC NON-CHARGEABLE SUPPLY: Performed by: ORTHOPAEDIC SURGERY

## 2022-11-09 PROCEDURE — 3700000001 HC ADD 15 MINUTES (ANESTHESIA): Performed by: ORTHOPAEDIC SURGERY

## 2022-11-09 PROCEDURE — 2580000003 HC RX 258

## 2022-11-09 PROCEDURE — 2580000003 HC RX 258: Performed by: ORTHOPAEDIC SURGERY

## 2022-11-09 PROCEDURE — 1200000000 HC SEMI PRIVATE

## 2022-11-09 PROCEDURE — 6360000002 HC RX W HCPCS: Performed by: NURSE ANESTHETIST, CERTIFIED REGISTERED

## 2022-11-09 PROCEDURE — 6360000002 HC RX W HCPCS: Performed by: ORTHOPAEDIC SURGERY

## 2022-11-09 PROCEDURE — 95940 IONM IN OPERATNG ROOM 15 MIN: CPT | Performed by: AUDIOLOGIST

## 2022-11-09 PROCEDURE — 3209999900 FLUORO FOR SURGICAL PROCEDURES

## 2022-11-09 PROCEDURE — 3700000000 HC ANESTHESIA ATTENDED CARE: Performed by: ORTHOPAEDIC SURGERY

## 2022-11-09 PROCEDURE — 7100000000 HC PACU RECOVERY - FIRST 15 MIN: Performed by: ORTHOPAEDIC SURGERY

## 2022-11-09 PROCEDURE — C1713 ANCHOR/SCREW BN/BN,TIS/BN: HCPCS | Performed by: ORTHOPAEDIC SURGERY

## 2022-11-09 PROCEDURE — C9359 IMPLNT,BON VOID FILLER-PUTTY: HCPCS | Performed by: ORTHOPAEDIC SURGERY

## 2022-11-09 PROCEDURE — C1729 CATH, DRAINAGE: HCPCS | Performed by: ORTHOPAEDIC SURGERY

## 2022-11-09 PROCEDURE — 95938 SOMATOSENSORY TESTING: CPT | Performed by: AUDIOLOGIST

## 2022-11-09 PROCEDURE — 7100000001 HC PACU RECOVERY - ADDTL 15 MIN: Performed by: ORTHOPAEDIC SURGERY

## 2022-11-09 PROCEDURE — 01NB0ZZ RELEASE LUMBAR NERVE, OPEN APPROACH: ICD-10-PCS | Performed by: ORTHOPAEDIC SURGERY

## 2022-11-09 PROCEDURE — 2500000003 HC RX 250 WO HCPCS: Performed by: NURSE ANESTHETIST, CERTIFIED REGISTERED

## 2022-11-09 PROCEDURE — 0SG1071 FUSION OF 2 OR MORE LUMBAR VERTEBRAL JOINTS WITH AUTOLOGOUS TISSUE SUBSTITUTE, POSTERIOR APPROACH, POSTERIOR COLUMN, OPEN APPROACH: ICD-10-PCS | Performed by: ORTHOPAEDIC SURGERY

## 2022-11-09 PROCEDURE — 0SG3071 FUSION OF LUMBOSACRAL JOINT WITH AUTOLOGOUS TISSUE SUBSTITUTE, POSTERIOR APPROACH, POSTERIOR COLUMN, OPEN APPROACH: ICD-10-PCS | Performed by: ORTHOPAEDIC SURGERY

## 2022-11-09 PROCEDURE — 2500000003 HC RX 250 WO HCPCS: Performed by: ORTHOPAEDIC SURGERY

## 2022-11-09 PROCEDURE — 3600000005 HC SURGERY LEVEL 5 BASE: Performed by: ORTHOPAEDIC SURGERY

## 2022-11-09 DEVICE — VIASORB STRP 20X50X5MM: Type: IMPLANTABLE DEVICE | Site: BACK | Status: FUNCTIONAL

## 2022-11-09 DEVICE — DURASEAL® EXACT SPINAL SEALANT SYSTEM 5ML 5 PACK
Type: IMPLANTABLE DEVICE | Site: BACK | Status: FUNCTIONAL
Brand: DURASEAL EXACT SPINAL SEALANT SYSTEM

## 2022-11-09 DEVICE — CREO® THREADED 8.5 X 45MM POLYAXIAL SCREW
Type: IMPLANTABLE DEVICE | Site: BACK | Status: FUNCTIONAL
Brand: CREO

## 2022-11-09 DEVICE — CREO® THREADED 7.5 X 45MM POLYAXIAL SCREW
Type: IMPLANTABLE DEVICE | Site: BACK | Status: FUNCTIONAL
Brand: CREO

## 2022-11-09 DEVICE — THREADED LOCKING CAP, CREO
Type: IMPLANTABLE DEVICE | Site: BACK | Status: FUNCTIONAL
Brand: CREO

## 2022-11-09 DEVICE — 5.5MM CURVED ROD, TITANIUM ALLOY, 90MM LENGTH
Type: IMPLANTABLE DEVICE | Site: BACK | Status: FUNCTIONAL
Brand: CREO

## 2022-11-09 DEVICE — CREO® THREADED 7.5 X 50MM POLYAXIAL SCREW
Type: IMPLANTABLE DEVICE | Site: BACK | Status: FUNCTIONAL
Brand: CREO

## 2022-11-09 DEVICE — XEMPLIFI DBM PLUS, 10CC
Type: IMPLANTABLE DEVICE | Site: BACK | Status: FUNCTIONAL
Brand: XEMPLIFI

## 2022-11-09 RX ORDER — PROPOFOL 10 MG/ML
INJECTION, EMULSION INTRAVENOUS PRN
Status: DISCONTINUED | OUTPATIENT
Start: 2022-11-09 | End: 2022-11-09 | Stop reason: SDUPTHER

## 2022-11-09 RX ORDER — MIDAZOLAM HYDROCHLORIDE 2 MG/2ML
2 INJECTION, SOLUTION INTRAMUSCULAR; INTRAVENOUS
Status: DISCONTINUED | OUTPATIENT
Start: 2022-11-09 | End: 2022-11-09 | Stop reason: HOSPADM

## 2022-11-09 RX ORDER — SODIUM CHLORIDE 0.9 % (FLUSH) 0.9 %
5-40 SYRINGE (ML) INJECTION EVERY 12 HOURS SCHEDULED
Status: DISCONTINUED | OUTPATIENT
Start: 2022-11-09 | End: 2022-11-09 | Stop reason: HOSPADM

## 2022-11-09 RX ORDER — SODIUM CHLORIDE 0.9 % (FLUSH) 0.9 %
5-40 SYRINGE (ML) INJECTION PRN
Status: DISCONTINUED | OUTPATIENT
Start: 2022-11-09 | End: 2022-11-09 | Stop reason: HOSPADM

## 2022-11-09 RX ORDER — VANCOMYCIN HYDROCHLORIDE 1 G/20ML
INJECTION, POWDER, LYOPHILIZED, FOR SOLUTION INTRAVENOUS PRN
Status: DISCONTINUED | OUTPATIENT
Start: 2022-11-09 | End: 2022-11-09 | Stop reason: ALTCHOICE

## 2022-11-09 RX ORDER — ALLOPURINOL 300 MG/1
300 TABLET ORAL DAILY
Status: DISCONTINUED | OUTPATIENT
Start: 2022-11-10 | End: 2022-11-13 | Stop reason: HOSPADM

## 2022-11-09 RX ORDER — PROMETHAZINE HYDROCHLORIDE 12.5 MG/1
12.5 TABLET ORAL EVERY 6 HOURS PRN
Status: DISCONTINUED | OUTPATIENT
Start: 2022-11-09 | End: 2022-11-13 | Stop reason: HOSPADM

## 2022-11-09 RX ORDER — ONDANSETRON 2 MG/ML
4 INJECTION INTRAMUSCULAR; INTRAVENOUS
Status: DISCONTINUED | OUTPATIENT
Start: 2022-11-09 | End: 2022-11-09 | Stop reason: HOSPADM

## 2022-11-09 RX ORDER — ONDANSETRON 2 MG/ML
INJECTION INTRAMUSCULAR; INTRAVENOUS PRN
Status: DISCONTINUED | OUTPATIENT
Start: 2022-11-09 | End: 2022-11-09 | Stop reason: SDUPTHER

## 2022-11-09 RX ORDER — OXYCODONE HYDROCHLORIDE 10 MG/1
10 TABLET ORAL EVERY 4 HOURS PRN
Status: DISCONTINUED | OUTPATIENT
Start: 2022-11-09 | End: 2022-11-13 | Stop reason: HOSPADM

## 2022-11-09 RX ORDER — SODIUM CHLORIDE 0.9 % (FLUSH) 0.9 %
5-40 SYRINGE (ML) INJECTION PRN
Status: DISCONTINUED | OUTPATIENT
Start: 2022-11-09 | End: 2022-11-13 | Stop reason: HOSPADM

## 2022-11-09 RX ORDER — LORAZEPAM 1 MG/1
1 TABLET ORAL EVERY 6 HOURS PRN
Status: DISCONTINUED | OUTPATIENT
Start: 2022-11-09 | End: 2022-11-10

## 2022-11-09 RX ORDER — SODIUM CHLORIDE 9 MG/ML
25 INJECTION, SOLUTION INTRAVENOUS PRN
Status: DISCONTINUED | OUTPATIENT
Start: 2022-11-09 | End: 2022-11-09 | Stop reason: HOSPADM

## 2022-11-09 RX ORDER — BISACODYL 10 MG
10 SUPPOSITORY, RECTAL RECTAL DAILY PRN
Status: DISCONTINUED | OUTPATIENT
Start: 2022-11-09 | End: 2022-11-13 | Stop reason: HOSPADM

## 2022-11-09 RX ORDER — ONDANSETRON 2 MG/ML
4 INJECTION INTRAMUSCULAR; INTRAVENOUS EVERY 6 HOURS PRN
Status: DISCONTINUED | OUTPATIENT
Start: 2022-11-09 | End: 2022-11-13 | Stop reason: HOSPADM

## 2022-11-09 RX ORDER — IPRATROPIUM BROMIDE AND ALBUTEROL SULFATE 2.5; .5 MG/3ML; MG/3ML
1 SOLUTION RESPIRATORY (INHALATION)
Status: DISCONTINUED | OUTPATIENT
Start: 2022-11-09 | End: 2022-11-09 | Stop reason: HOSPADM

## 2022-11-09 RX ORDER — SODIUM CHLORIDE 0.9 % (FLUSH) 0.9 %
5-40 SYRINGE (ML) INJECTION EVERY 12 HOURS SCHEDULED
Status: DISCONTINUED | OUTPATIENT
Start: 2022-11-09 | End: 2022-11-13 | Stop reason: HOSPADM

## 2022-11-09 RX ORDER — TAMSULOSIN HYDROCHLORIDE 0.4 MG/1
0.4 CAPSULE ORAL DAILY
Status: DISCONTINUED | OUTPATIENT
Start: 2022-11-10 | End: 2022-11-13 | Stop reason: HOSPADM

## 2022-11-09 RX ORDER — SODIUM CHLORIDE 9 MG/ML
INJECTION, SOLUTION INTRAVENOUS PRN
Status: DISCONTINUED | OUTPATIENT
Start: 2022-11-09 | End: 2022-11-13 | Stop reason: HOSPADM

## 2022-11-09 RX ORDER — LIDOCAINE HYDROCHLORIDE 10 MG/ML
INJECTION, SOLUTION EPIDURAL; INFILTRATION; INTRACAUDAL; PERINEURAL
Status: DISCONTINUED
Start: 2022-11-09 | End: 2022-11-09

## 2022-11-09 RX ORDER — TRAMADOL HYDROCHLORIDE 50 MG/1
50 TABLET ORAL
Status: DISCONTINUED | OUTPATIENT
Start: 2022-11-09 | End: 2022-11-09 | Stop reason: HOSPADM

## 2022-11-09 RX ORDER — HYDRALAZINE HYDROCHLORIDE 20 MG/ML
5 INJECTION INTRAMUSCULAR; INTRAVENOUS
Status: DISCONTINUED | OUTPATIENT
Start: 2022-11-09 | End: 2022-11-09 | Stop reason: HOSPADM

## 2022-11-09 RX ORDER — VITAMIN B COMPLEX
3000 TABLET ORAL DAILY
Status: DISCONTINUED | OUTPATIENT
Start: 2022-11-10 | End: 2022-11-13 | Stop reason: HOSPADM

## 2022-11-09 RX ORDER — OXYCODONE HYDROCHLORIDE 5 MG/1
5 TABLET ORAL EVERY 4 HOURS PRN
Status: DISCONTINUED | OUTPATIENT
Start: 2022-11-09 | End: 2022-11-13 | Stop reason: HOSPADM

## 2022-11-09 RX ORDER — ROCURONIUM BROMIDE 10 MG/ML
INJECTION, SOLUTION INTRAVENOUS PRN
Status: DISCONTINUED | OUTPATIENT
Start: 2022-11-09 | End: 2022-11-09 | Stop reason: SDUPTHER

## 2022-11-09 RX ORDER — GLYCOPYRROLATE 0.2 MG/ML
INJECTION INTRAMUSCULAR; INTRAVENOUS PRN
Status: DISCONTINUED | OUTPATIENT
Start: 2022-11-09 | End: 2022-11-09 | Stop reason: SDUPTHER

## 2022-11-09 RX ORDER — NEOSTIGMINE METHYLSULFATE 1 MG/ML
INJECTION, SOLUTION INTRAVENOUS PRN
Status: DISCONTINUED | OUTPATIENT
Start: 2022-11-09 | End: 2022-11-09 | Stop reason: SDUPTHER

## 2022-11-09 RX ORDER — ATORVASTATIN CALCIUM 20 MG/1
20 TABLET, FILM COATED ORAL EVERY EVENING
Status: DISCONTINUED | OUTPATIENT
Start: 2022-11-09 | End: 2022-11-13 | Stop reason: HOSPADM

## 2022-11-09 RX ORDER — POLYETHYLENE GLYCOL 3350 17 G/17G
17 POWDER, FOR SOLUTION ORAL DAILY
Status: DISCONTINUED | OUTPATIENT
Start: 2022-11-10 | End: 2022-11-13 | Stop reason: HOSPADM

## 2022-11-09 RX ORDER — ACETAMINOPHEN 325 MG/1
650 TABLET ORAL
Status: DISCONTINUED | OUTPATIENT
Start: 2022-11-09 | End: 2022-11-09 | Stop reason: HOSPADM

## 2022-11-09 RX ORDER — DEXAMETHASONE SODIUM PHOSPHATE 10 MG/ML
8 INJECTION INTRAMUSCULAR; INTRAVENOUS ONCE
Status: COMPLETED | OUTPATIENT
Start: 2022-11-09 | End: 2022-11-09

## 2022-11-09 RX ORDER — ACETAMINOPHEN 500 MG
1000 TABLET ORAL ONCE
Status: COMPLETED | OUTPATIENT
Start: 2022-11-09 | End: 2022-11-09

## 2022-11-09 RX ORDER — LIDOCAINE HYDROCHLORIDE 20 MG/ML
INJECTION, SOLUTION INTRAVENOUS PRN
Status: DISCONTINUED | OUTPATIENT
Start: 2022-11-09 | End: 2022-11-09 | Stop reason: SDUPTHER

## 2022-11-09 RX ORDER — DIPHENHYDRAMINE HYDROCHLORIDE 50 MG/ML
12.5 INJECTION INTRAMUSCULAR; INTRAVENOUS
Status: DISCONTINUED | OUTPATIENT
Start: 2022-11-09 | End: 2022-11-09 | Stop reason: HOSPADM

## 2022-11-09 RX ORDER — SODIUM CHLORIDE 9 MG/ML
INJECTION, SOLUTION INTRAVENOUS CONTINUOUS
Status: DISCONTINUED | OUTPATIENT
Start: 2022-11-09 | End: 2022-11-13 | Stop reason: HOSPADM

## 2022-11-09 RX ORDER — LABETALOL HYDROCHLORIDE 5 MG/ML
5 INJECTION, SOLUTION INTRAVENOUS
Status: DISCONTINUED | OUTPATIENT
Start: 2022-11-09 | End: 2022-11-09 | Stop reason: HOSPADM

## 2022-11-09 RX ORDER — SODIUM CHLORIDE 9 MG/ML
INJECTION, SOLUTION INTRAVENOUS PRN
Status: DISCONTINUED | OUTPATIENT
Start: 2022-11-09 | End: 2022-11-09 | Stop reason: HOSPADM

## 2022-11-09 RX ORDER — DEXAMETHASONE SODIUM PHOSPHATE 10 MG/ML
INJECTION INTRAMUSCULAR; INTRAVENOUS PRN
Status: DISCONTINUED | OUTPATIENT
Start: 2022-11-09 | End: 2022-11-09 | Stop reason: SDUPTHER

## 2022-11-09 RX ORDER — FENTANYL CITRATE 50 UG/ML
INJECTION, SOLUTION INTRAMUSCULAR; INTRAVENOUS PRN
Status: DISCONTINUED | OUTPATIENT
Start: 2022-11-09 | End: 2022-11-09 | Stop reason: SDUPTHER

## 2022-11-09 RX ORDER — CARVEDILOL 6.25 MG/1
6.25 TABLET ORAL 2 TIMES DAILY WITH MEALS
Status: DISCONTINUED | OUTPATIENT
Start: 2022-11-10 | End: 2022-11-13 | Stop reason: HOSPADM

## 2022-11-09 RX ORDER — DROPERIDOL 2.5 MG/ML
0.62 INJECTION, SOLUTION INTRAMUSCULAR; INTRAVENOUS
Status: DISCONTINUED | OUTPATIENT
Start: 2022-11-09 | End: 2022-11-09 | Stop reason: HOSPADM

## 2022-11-09 RX ORDER — ACETAMINOPHEN 325 MG/1
650 TABLET ORAL EVERY 6 HOURS
Status: DISCONTINUED | OUTPATIENT
Start: 2022-11-09 | End: 2022-11-13 | Stop reason: HOSPADM

## 2022-11-09 RX ORDER — DEXAMETHASONE SODIUM PHOSPHATE 4 MG/ML
4 INJECTION, SOLUTION INTRA-ARTICULAR; INTRALESIONAL; INTRAMUSCULAR; INTRAVENOUS; SOFT TISSUE EVERY 6 HOURS
Status: COMPLETED | OUTPATIENT
Start: 2022-11-09 | End: 2022-11-10

## 2022-11-09 RX ORDER — SODIUM CHLORIDE 9 MG/ML
INJECTION, SOLUTION INTRAVENOUS CONTINUOUS PRN
Status: DISCONTINUED | OUTPATIENT
Start: 2022-11-09 | End: 2022-11-09

## 2022-11-09 RX ORDER — GABAPENTIN 300 MG/1
300 CAPSULE ORAL 3 TIMES DAILY
Status: DISCONTINUED | OUTPATIENT
Start: 2022-11-09 | End: 2022-11-13 | Stop reason: HOSPADM

## 2022-11-09 RX ADMIN — SODIUM CHLORIDE: 9 INJECTION, SOLUTION INTRAVENOUS at 21:11

## 2022-11-09 RX ADMIN — ACETAMINOPHEN 1000 MG: 500 TABLET ORAL at 09:59

## 2022-11-09 RX ADMIN — GLYCOPYRROLATE 0.6 MG: 1 INJECTION INTRAMUSCULAR; INTRAVENOUS at 19:29

## 2022-11-09 RX ADMIN — FENTANYL CITRATE 50 MCG: 50 INJECTION, SOLUTION INTRAMUSCULAR; INTRAVENOUS at 16:40

## 2022-11-09 RX ADMIN — FENTANYL CITRATE 50 MCG: 50 INJECTION, SOLUTION INTRAMUSCULAR; INTRAVENOUS at 12:25

## 2022-11-09 RX ADMIN — PHENYLEPHRINE HYDROCHLORIDE 25 MCG/MIN: 10 INJECTION, SOLUTION INTRAMUSCULAR; INTRAVENOUS; SUBCUTANEOUS at 14:05

## 2022-11-09 RX ADMIN — FENTANYL CITRATE 50 MCG: 50 INJECTION, SOLUTION INTRAMUSCULAR; INTRAVENOUS at 17:01

## 2022-11-09 RX ADMIN — ROCURONIUM BROMIDE 50 MG: 10 INJECTION, SOLUTION INTRAVENOUS at 12:25

## 2022-11-09 RX ADMIN — ONDANSETRON HYDROCHLORIDE 4 MG: 2 SOLUTION INTRAMUSCULAR; INTRAVENOUS at 19:03

## 2022-11-09 RX ADMIN — FENTANYL CITRATE 50 MCG: 50 INJECTION, SOLUTION INTRAMUSCULAR; INTRAVENOUS at 12:38

## 2022-11-09 RX ADMIN — ATORVASTATIN CALCIUM 20 MG: 20 TABLET, FILM COATED ORAL at 21:54

## 2022-11-09 RX ADMIN — SODIUM CHLORIDE: 9 INJECTION, SOLUTION INTRAVENOUS at 09:53

## 2022-11-09 RX ADMIN — CEFAZOLIN 2000 MG: 2 INJECTION, POWDER, FOR SOLUTION INTRAMUSCULAR; INTRAVENOUS at 16:21

## 2022-11-09 RX ADMIN — CEFAZOLIN 2000 MG: 2 INJECTION, POWDER, FOR SOLUTION INTRAMUSCULAR; INTRAVENOUS at 12:21

## 2022-11-09 RX ADMIN — DEXAMETHASONE SODIUM PHOSPHATE 4 MG: 4 INJECTION, SOLUTION INTRA-ARTICULAR; INTRALESIONAL; INTRAMUSCULAR; INTRAVENOUS; SOFT TISSUE at 21:53

## 2022-11-09 RX ADMIN — SODIUM CHLORIDE, PRESERVATIVE FREE 10 ML: 5 INJECTION INTRAVENOUS at 21:54

## 2022-11-09 RX ADMIN — ACETAMINOPHEN 650 MG: 325 TABLET, FILM COATED ORAL at 21:54

## 2022-11-09 RX ADMIN — PROPOFOL 100 MG: 10 INJECTION, EMULSION INTRAVENOUS at 12:25

## 2022-11-09 RX ADMIN — FENTANYL CITRATE 50 MCG: 50 INJECTION, SOLUTION INTRAMUSCULAR; INTRAVENOUS at 15:05

## 2022-11-09 RX ADMIN — SODIUM CHLORIDE: 9 INJECTION, SOLUTION INTRAVENOUS at 12:15

## 2022-11-09 RX ADMIN — DEXAMETHASONE SODIUM PHOSPHATE 8 MG: 10 INJECTION INTRAMUSCULAR; INTRAVENOUS at 10:00

## 2022-11-09 RX ADMIN — Medication 3 MG: at 19:29

## 2022-11-09 RX ADMIN — GABAPENTIN 300 MG: 300 CAPSULE ORAL at 21:54

## 2022-11-09 RX ADMIN — LIDOCAINE HYDROCHLORIDE 60 MG: 20 INJECTION, SOLUTION INTRAVENOUS at 12:25

## 2022-11-09 RX ADMIN — DEXAMETHASONE SODIUM PHOSPHATE 10 MG: 10 INJECTION INTRAMUSCULAR; INTRAVENOUS at 12:25

## 2022-11-09 ASSESSMENT — PAIN - FUNCTIONAL ASSESSMENT: PAIN_FUNCTIONAL_ASSESSMENT: 0-10

## 2022-11-09 ASSESSMENT — PAIN SCALES - GENERAL
PAINLEVEL_OUTOF10: 5
PAINLEVEL_OUTOF10: 0

## 2022-11-09 ASSESSMENT — PAIN DESCRIPTION - ORIENTATION
ORIENTATION: LOWER;MID
ORIENTATION: LOWER;MID

## 2022-11-09 ASSESSMENT — PAIN DESCRIPTION - LOCATION
LOCATION: BACK
LOCATION: BACK

## 2022-11-09 ASSESSMENT — PAIN DESCRIPTION - DESCRIPTORS
DESCRIPTORS: ACHING;CRAMPING;DISCOMFORT
DESCRIPTORS: BURNING
DESCRIPTORS: BURNING

## 2022-11-09 ASSESSMENT — PAIN DESCRIPTION - PAIN TYPE
TYPE: SURGICAL PAIN;ACUTE PAIN
TYPE: SURGICAL PAIN;ACUTE PAIN

## 2022-11-09 ASSESSMENT — PAIN DESCRIPTION - ONSET
ONSET: GRADUAL
ONSET: AWAKENED FROM SLEEP

## 2022-11-09 NOTE — INTERVAL H&P NOTE
Update History & Physical    The patient's History and Physical of November 9, 2022 was reviewed with the patient and I examined the patient. There was no change. The surgical site was confirmed by the patient and me. Plan: The risks, benefits, expected outcome, and alternative to the recommended procedure have been discussed with the patient. Patient understands and wants to proceed with the procedure.      Electronically signed by Miguel Cerrato DO on 11/9/2022 at 11:04 AM

## 2022-11-09 NOTE — ANESTHESIA PRE PROCEDURE
Department of Anesthesiology  Preprocedure Note       Name:  Paul Kelley   Age:  81 y.o.  :  1940                                          MRN:  [id number]         Date:  2022      Surgeon: Karina Lopez):  Cassandra Mckenzie,     Procedure: Procedure(s):  LUMBAR TRANSFORAMINAL INTERBODY FUSION POSTERIOR ONE , LEVEL, LUMBAR 3- SACRUM 1 WITH ALOGRAFT AND SACRUM GARFT, NURO MONITER, CELL SAVER    Medications prior to admission:   Prior to Admission medications    Medication Sig Start Date End Date Taking? Authorizing Provider   gabapentin (NEURONTIN) 300 MG capsule Take 1 capsule by mouth in the morning and 1 capsule at noon and 1 capsule before bedtime. Do all this for 30 days. 22  MANUEL Person   traMADol (ULTRAM) 50 MG tablet 50 mg every 6 hours as needed. 22   Historical Provider, MD   LORazepam (ATIVAN) 1 MG tablet Take 1 mg by mouth every 6 hours as needed. 10/18/19   Historical Provider, MD   atorvastatin (LIPITOR) 20 MG tablet Take 20 mg by mouth every evening    Historical Provider, MD   aspirin 81 MG tablet Take 81 mg by mouth daily    Historical Provider, MD   allopurinol (ZYLOPRIM) 300 MG tablet Take 300 mg by mouth daily  8/8/15   Historical Provider, MD   Cholecalciferol (VITAMIN D3) 75 MCG (3000 UT) TABS Take 3,000 Units by mouth daily     Historical Provider, MD   carvedilol (COREG) 6.25 MG tablet Take 6.25 mg by mouth 2 times daily (with meals).  Instructed to take with sip water am of procedure    Historical Provider, MD       Current medications:    Current Facility-Administered Medications   Medication Dose Route Frequency Provider Last Rate Last Admin    sodium chloride flush 0.9 % injection 5-40 mL  5-40 mL IntraVENous 2 times per day Cassandra Single, DO        sodium chloride flush 0.9 % injection 5-40 mL  5-40 mL IntraVENous PRN Cassandra Single, DO        0.9 % sodium chloride infusion   IntraVENous PRN Cassandra Single, DO        acetaminophen (TYLENOL) tablet 1,000 mg  1,000 mg Oral Once Andria Sanchez DO        dexamethasone (DECADRON) injection 8 mg  8 mg IntraVENous Once Andria Sanchez DO        ceFAZolin (ANCEF) 2,000 mg in sterile water 20 mL IV syringe  2,000 mg IntraVENous On Call to Gab[address] Loz[address], DO           Allergies:     Allergies   Allergen Reactions    Adhesive Tape        Problem List:    Patient Active Problem List   Diagnosis Code    History of MI (myocardial infarction) - acute inferior wall I25.2    CAD (coronary artery disease) I25.10    S/P primary angioplasty with coronary stent - RCA  Z95.5    Left ventricular dysfunction I51.9    Hyperlipidemia E78.5    Acute deep vein thrombosis (DVT) of distal vein of right lower extremity (HCC) I82.4Z1    Venous stasis of both lower extremities I87.8    Spinal stenosis, lumbar region, with neurogenic claudication M48.062       Past Medical History:        Diagnosis Date    Acute deep vein thrombosis (DVT) of distal vein of right lower extremity (HCC) 7/14/2016    Acute MI, inferior wall ([address] 9/3/08    Anxiety     Arthritis     osteo    CAD (coronary artery disease)     follows with Dr. Pat Olmstead    Hip pain     Hyperlipidemia     Hypertension     Left ventricular dysfunction     Retention of urine, unspecified     Venous stasis of both lower extremities 7/14/2016       Past Surgical History:        Procedure Laterality Date    COLONOSCOPY      CORONARY ANGIOPLASTY  9/3/08    PTCA/stent of the RCA 3.8 x 18 mm [place] stent     DIAGNOSTIC CARDIAC CATH LAB PROCEDURE  9/3/08    [facility]     JOINT REPLACEMENT  2011    left knee    KNEE ARTHROPLASTY  12/1/14    right total knee     ROTATOR CUFF REPAIR  3/1/2013    right    SKIN BIOPSY      removal squamous cell ca from scalp    TURP  2012       Social History:    Social History     Tobacco Use    Smoking status: Never    Smokeless tobacco: Never   Substance Use Topics    Alcohol use: No                                Counseling given: Not Answered      Vital Signs (Current): There were no vitals filed for this visit. BP Readings from Last 3 Encounters:   11/08/22 126/63   10/19/22 130/70   09/21/22 (!) 142/70       NPO Status:                                                                                 BMI:   Wt Readings from Last 3 Encounters:   11/08/22 174 lb (78.9 kg)   11/03/22 178 lb (80.7 kg)   10/19/22 175 lb (79.4 kg)     There is no height or weight on file to calculate BMI.    CBC:   Lab Results   Component Value Date/Time    WBC 4.7 11/08/2022 11:50 AM    RBC 4.36 11/08/2022 11:50 AM    HGB 12.0 11/08/2022 11:50 AM    HCT 37.7 11/08/2022 11:50 AM    MCV 86.5 11/08/2022 11:50 AM    RDW 15.9 11/08/2022 11:50 AM     11/08/2022 11:50 AM       CMP:   Lab Results   Component Value Date/Time     12/14/2017 04:20 AM    K 4.1 12/14/2017 04:20 AM     12/14/2017 04:20 AM    CO2 27 12/14/2017 04:20 AM    BUN 16 12/14/2017 04:20 AM    CREATININE 0.7 12/14/2017 04:20 AM    GFRAA >60 12/14/2017 04:20 AM    LABGLOM >60 12/14/2017 04:20 AM    GLUCOSE 105 12/14/2017 04:20 AM    GLUCOSE 97 02/09/2011 03:10 PM    PROT 5.8 06/20/2016 02:00 PM    CALCIUM 9.0 12/14/2017 04:20 AM    BILITOT 0.7 06/20/2016 02:00 PM    ALKPHOS 59 06/20/2016 02:00 PM    AST 22 06/20/2016 02:00 PM    ALT 21 06/20/2016 02:00 PM       POC Tests: No results for input(s): POCGLU, POCNA, POCK, POCCL, POCBUN, POCHEMO, POCHCT in the last 72 hours.     Coags:   Lab Results   Component Value Date/Time    PROTIME 12.5 11/08/2022 11:50 AM    PROTIME 11.4 02/09/2011 03:10 PM    INR 1.1 11/08/2022 11:50 AM    APTT 31.7 11/08/2022 11:50 AM       HCG (If Applicable): No results found for: PREGTESTUR, PREGSERUM, HCG, HCGQUANT     ABGs: No results found for: PHART, PO2ART, UIR8QTP, PPL9THH, BEART, H1AFCPTW     Type & Screen (If Applicable):  No results found for: LABABO, [address]    Drug/Infectious Status (If Applicable):  No results found for: HIV, HEPCAB    COVID-19 Screening (If Applicable): No results found for: COVID19        Anesthesia Evaluation  Patient summary reviewed no history of anesthetic complications:   Airway: Mallampati: II  TM distance: >3 FB   Neck ROM: full  Mouth opening: < 3 FB   Dental:          Pulmonary:Negative Pulmonary ROS breath sounds clear to auscultation                             Cardiovascular:  Exercise tolerance: good (>4 METS),   (+) hypertension:, past MI:, CAD:, CABG/stent (S/p stent RCA):, CHF (LVEF 54%, stage 1 diastolic dysfunctino): systolic and diastolic, hyperlipidemia      ECG reviewed  Rhythm: regular  Rate: normal    Stress test reviewed  Cleared by cardiology           ROS comment: Stress Test 10/19/2022:  Impression:    1. ECG during the infusion did not change. 2. The myocardial perfusion imaging was abnormal.    The abnormality was a a large sized fixed defect in the inferior wall suggestive of a prior MI and a moderate sized partially reversible defect in the apex wall  3. Overall left ventricular systolic function was abnormal with regional wall motion abnormalities. 4. Intermediate risk general pharmacologic stress test.    EKG 9/2022:  Sinus  Rhythm  - occasional ectopic ventricular beat     -Old anterior infarct. Neuro/Psych:   Negative Neuro/Psych ROS              GI/Hepatic/Renal: Neg GI/Hepatic/Renal ROS            Endo/Other: Negative Endo/Other ROS                    Abdominal:             Vascular:   + DVT (Hx of DVT), . Other Findings:        Cardiology evaluation 10/2022:  \"----- Message from Lisa Patel MD sent at 10/19/2022  1:37 PM EDT -----  Please let Herminia Nix know that stress test looked okay. If he decides to proceed with back surgery please let us know the name of his surgeon so we can dictate a letter to him. \"    Cardiology evaluation 9/2022:  \"ASSESSMENT: ORDERS:          Diagnosis Orders   1. Coronary artery disease involving native coronary artery of native heart without angina pectoris  EKG 12 lead       2. History of MI (myocardial infarction) - acute inferior wall  EKG 12 lead       3. Left ventricular dysfunction          4. S/P primary angioplasty with coronary stent - RCA   EKG 12 lead       5. Pure hypercholesterolemia             Above assessment cardiac issues stable.     PLAN:   See above orders. Reviewed prior records and testing:     Most recent lipid profile LDL 59 on 6/8/2020  Discussed issues that would prompt earlier evaluation. Follow-up office visit in 1 year. Consider stress testing if patient decides to proceed with spinal surgery. \"       Anesthesia Plan      general     ASA 3       Induction: intravenous. arterial line  MIPS: Postoperative opioids intended, Prophylactic antiemetics administered and Postoperative trial extubation. Anesthetic plan and risks discussed with patient. Use of blood products discussed with patient whom consented to blood products. Plan discussed with CRNA.                     Arnuad Larios MD   11/9/2022

## 2022-11-09 NOTE — ANESTHESIA PROCEDURE NOTES
Arterial Line:    An arterial line was placed using ultrasound guidance, in the pre-op for the following indication(s): continuous blood pressure monitoring. A 20 gauge (size) (length), Arrow (type) catheter was placed, Seldinger technique used, into the left radial artery, secured by tape and Tegaderm. Anesthesia type: Local  Local infiltration: Injection    Events:  patient tolerated procedure well with no complications. 11/9/2022 11:10 AM11/9/2022 11:20 AM  Anesthesiologist: Abrahan Saenz MD  Other anesthesia staff: Mio Barreto RN  Performed: Anesthesiologist   Preanesthetic Checklist  Completed: patient identified, IV checked, site marked, risks and benefits discussed, surgical/procedural consents, equipment checked, pre-op evaluation, timeout performed, anesthesia consent given, oxygen available, monitors applied/VS acknowledged, fire risk safety assessment completed and verbalized and blood product R/B/A discussed and consented

## 2022-11-10 LAB
ANION GAP SERPL CALCULATED.3IONS-SCNC: 10 MMOL/L (ref 7–16)
BUN BLDV-MCNC: 24 MG/DL (ref 6–23)
CALCIUM SERPL-MCNC: 8.4 MG/DL (ref 8.6–10.2)
CHLORIDE BLD-SCNC: 103 MMOL/L (ref 98–107)
CO2: 25 MMOL/L (ref 22–29)
CREAT SERPL-MCNC: 0.8 MG/DL (ref 0.7–1.2)
GFR SERPL CREATININE-BSD FRML MDRD: >60 ML/MIN/1.73
GLUCOSE BLD-MCNC: 130 MG/DL (ref 74–99)
HCT VFR BLD CALC: 33.3 % (ref 37–54)
HEMOGLOBIN: 10.7 G/DL (ref 12.5–16.5)
MCH RBC QN AUTO: 27.5 PG (ref 26–35)
MCHC RBC AUTO-ENTMCNC: 32.1 % (ref 32–34.5)
MCV RBC AUTO: 85.6 FL (ref 80–99.9)
PDW BLD-RTO: 16 FL (ref 11.5–15)
PLATELET # BLD: 179 E9/L (ref 130–450)
PMV BLD AUTO: 10.4 FL (ref 7–12)
POTASSIUM SERPL-SCNC: 4.4 MMOL/L (ref 3.5–5)
RBC # BLD: 3.89 E12/L (ref 3.8–5.8)
SODIUM BLD-SCNC: 138 MMOL/L (ref 132–146)
WBC # BLD: 7.2 E9/L (ref 4.5–11.5)

## 2022-11-10 PROCEDURE — 97165 OT EVAL LOW COMPLEX 30 MIN: CPT

## 2022-11-10 PROCEDURE — 97161 PT EVAL LOW COMPLEX 20 MIN: CPT

## 2022-11-10 PROCEDURE — 97535 SELF CARE MNGMENT TRAINING: CPT

## 2022-11-10 PROCEDURE — 6370000000 HC RX 637 (ALT 250 FOR IP): Performed by: ORTHOPAEDIC SURGERY

## 2022-11-10 PROCEDURE — 99223 1ST HOSP IP/OBS HIGH 75: CPT | Performed by: INTERNAL MEDICINE

## 2022-11-10 PROCEDURE — 2580000003 HC RX 258: Performed by: ORTHOPAEDIC SURGERY

## 2022-11-10 PROCEDURE — 97530 THERAPEUTIC ACTIVITIES: CPT

## 2022-11-10 PROCEDURE — 85027 COMPLETE CBC AUTOMATED: CPT

## 2022-11-10 PROCEDURE — 6370000000 HC RX 637 (ALT 250 FOR IP): Performed by: INTERNAL MEDICINE

## 2022-11-10 PROCEDURE — 1200000000 HC SEMI PRIVATE

## 2022-11-10 PROCEDURE — 80048 BASIC METABOLIC PNL TOTAL CA: CPT

## 2022-11-10 PROCEDURE — 36415 COLL VENOUS BLD VENIPUNCTURE: CPT

## 2022-11-10 PROCEDURE — 6360000002 HC RX W HCPCS: Performed by: ORTHOPAEDIC SURGERY

## 2022-11-10 RX ORDER — LORAZEPAM 1 MG/1
1 TABLET ORAL 2 TIMES DAILY
Status: DISCONTINUED | OUTPATIENT
Start: 2022-11-10 | End: 2022-11-13 | Stop reason: HOSPADM

## 2022-11-10 RX ADMIN — HYDROMORPHONE HYDROCHLORIDE 0.25 MG: 1 INJECTION, SOLUTION INTRAMUSCULAR; INTRAVENOUS; SUBCUTANEOUS at 00:07

## 2022-11-10 RX ADMIN — TAMSULOSIN HYDROCHLORIDE 0.4 MG: 0.4 CAPSULE ORAL at 08:44

## 2022-11-10 RX ADMIN — SODIUM CHLORIDE, PRESERVATIVE FREE 10 ML: 5 INJECTION INTRAVENOUS at 21:03

## 2022-11-10 RX ADMIN — LORAZEPAM 1 MG: 1 TABLET ORAL at 13:16

## 2022-11-10 RX ADMIN — CEFAZOLIN 2000 MG: 2 INJECTION, POWDER, FOR SOLUTION INTRAMUSCULAR; INTRAVENOUS at 08:43

## 2022-11-10 RX ADMIN — SODIUM CHLORIDE, PRESERVATIVE FREE 10 ML: 5 INJECTION INTRAVENOUS at 08:44

## 2022-11-10 RX ADMIN — ALLOPURINOL 300 MG: 300 TABLET ORAL at 08:44

## 2022-11-10 RX ADMIN — CEFAZOLIN 2000 MG: 2 INJECTION, POWDER, FOR SOLUTION INTRAMUSCULAR; INTRAVENOUS at 00:06

## 2022-11-10 RX ADMIN — ACETAMINOPHEN 650 MG: 325 TABLET, FILM COATED ORAL at 22:22

## 2022-11-10 RX ADMIN — ATORVASTATIN CALCIUM 20 MG: 20 TABLET, FILM COATED ORAL at 17:00

## 2022-11-10 RX ADMIN — ACETAMINOPHEN 650 MG: 325 TABLET, FILM COATED ORAL at 15:04

## 2022-11-10 RX ADMIN — POLYETHYLENE GLYCOL 3350 17 G: 17 POWDER, FOR SOLUTION ORAL at 08:44

## 2022-11-10 RX ADMIN — GABAPENTIN 300 MG: 300 CAPSULE ORAL at 08:44

## 2022-11-10 RX ADMIN — ACETAMINOPHEN 650 MG: 325 TABLET, FILM COATED ORAL at 08:44

## 2022-11-10 RX ADMIN — ACETAMINOPHEN 650 MG: 325 TABLET, FILM COATED ORAL at 03:37

## 2022-11-10 RX ADMIN — DEXAMETHASONE SODIUM PHOSPHATE 4 MG: 4 INJECTION, SOLUTION INTRA-ARTICULAR; INTRALESIONAL; INTRAMUSCULAR; INTRAVENOUS; SOFT TISSUE at 08:44

## 2022-11-10 RX ADMIN — CARVEDILOL 6.25 MG: 6.25 TABLET, FILM COATED ORAL at 17:00

## 2022-11-10 RX ADMIN — Medication 3000 UNITS: at 08:44

## 2022-11-10 RX ADMIN — GABAPENTIN 300 MG: 300 CAPSULE ORAL at 21:02

## 2022-11-10 RX ADMIN — LORAZEPAM 1 MG: 1 TABLET ORAL at 21:02

## 2022-11-10 RX ADMIN — GABAPENTIN 300 MG: 300 CAPSULE ORAL at 13:16

## 2022-11-10 RX ADMIN — DEXAMETHASONE SODIUM PHOSPHATE 4 MG: 4 INJECTION, SOLUTION INTRA-ARTICULAR; INTRALESIONAL; INTRAMUSCULAR; INTRAVENOUS; SOFT TISSUE at 03:37

## 2022-11-10 RX ADMIN — SODIUM CHLORIDE: 9 INJECTION, SOLUTION INTRAVENOUS at 01:31

## 2022-11-10 RX ADMIN — DEXAMETHASONE SODIUM PHOSPHATE 4 MG: 4 INJECTION, SOLUTION INTRA-ARTICULAR; INTRALESIONAL; INTRAMUSCULAR; INTRAVENOUS; SOFT TISSUE at 15:04

## 2022-11-10 RX ADMIN — CARVEDILOL 6.25 MG: 6.25 TABLET, FILM COATED ORAL at 08:44

## 2022-11-10 ASSESSMENT — PAIN SCALES - GENERAL
PAINLEVEL_OUTOF10: 8
PAINLEVEL_OUTOF10: 5
PAINLEVEL_OUTOF10: 3

## 2022-11-10 ASSESSMENT — PAIN DESCRIPTION - LOCATION
LOCATION: BACK
LOCATION: BACK;LEG
LOCATION: BACK

## 2022-11-10 ASSESSMENT — PAIN DESCRIPTION - ORIENTATION: ORIENTATION: RIGHT;LEFT

## 2022-11-10 NOTE — PROGRESS NOTES
Doing well post op   Afebrile and hemodynamically stable  No chest pain or dyspnea  RRR  Lungs clear  Abdomen soft without tenderness  No significant peripheral edema   With change timing of Lorazepam as per patient request   Will follow medically

## 2022-11-10 NOTE — PLAN OF CARE
Problem: Discharge Planning  Goal: Discharge to home or other facility with appropriate resources  11/10/2022 1048 by Favio Valdivia RN  Outcome: Progressing  11/9/2022 2308 by Alba Laboy RN  Outcome: Progressing     Problem: Pain  Goal: Verbalizes/displays adequate comfort level or baseline comfort level  11/10/2022 1048 by Favio Valdivia RN  Outcome: Progressing  11/9/2022 2308 by Alba Laboy RN  Outcome: Progressing     Problem: ABCDS Injury Assessment  Goal: Absence of physical injury  11/10/2022 1048 by Favio Valdivia RN  Outcome: Progressing  11/9/2022 2308 by Alba Laboy RN  Outcome: Progressing     Problem: Skin/Tissue Integrity  Goal: Absence of new skin breakdown  Description: 1. Monitor for areas of redness and/or skin breakdown  2. Assess vascular access sites hourly  3. Every 4-6 hours minimum:  Change oxygen saturation probe site  4. Every 4-6 hours:  If on nasal continuous positive airway pressure, respiratory therapy assess nares and determine need for appliance change or resting period.   Outcome: Progressing

## 2022-11-10 NOTE — ANESTHESIA POSTPROCEDURE EVALUATION
Department of Anesthesiology  Postprocedure Note    Patient: Nick Kiser  MRN: [id number]  YOB: 1940  Date of evaluation: 11/9/2022      Procedure Summary     Date: 11/09/22 Room / Location: [facility] OR 04 / [facility]    Anesthesia Start: 1215 Anesthesia Stop: 1947    Procedure: TRANSFORAMINAL LUMBAR INTERBODY FUSION LUMBAR THREE- SACRUM ONE (Back) Diagnosis:       Pseudoclaudication syndrome      Spondylolisthesis of lumbar region      Rotoscoliosis      (Rotoscoliosis [M41.80])    Surgeons: Murray Lara DO Responsible Provider: Tyrese Hanley MD    Anesthesia Type: general ASA Status: 3          Anesthesia Type: No value filed.     Bernadette Phase I: Bernadette Score: 10    Bernadette Phase II:        Anesthesia Post Evaluation    Patient location during evaluation: PACU  Patient participation: complete - patient participated  Level of consciousness: awake and alert  Airway patency: patent  Nausea & Vomiting: no nausea and no vomiting  Complications: no  Cardiovascular status: hemodynamically stable  Respiratory status: acceptable  Hydration status: euvolemic

## 2022-11-10 NOTE — CONSULTS
CHIEF COMPLAINT: Post-op/CAD/ICMP    HISTORY OF PRESENT ILLNESS: Patient is a 81 y.o. male seen at the request of Gwen Martin MD and followed at our office by Dr. Roge Farrell. Consult for assistance with post-op management of patient. Significant cardiac history. Tolerated procedure well. No CP or SOB.      Past Medical History:   Diagnosis Date    Acute deep vein thrombosis (DVT) of distal vein of right lower extremity ([address] 7/14/2016    Acute MI, inferior wall ([address] 9/3/08    Anxiety     Arthritis     osteo    CAD (coronary artery disease)     follows with Dr. Ute Lux    Hip pain     Hyperlipidemia     Hypertension     Left ventricular dysfunction     Retention of urine, unspecified     Venous stasis of both lower extremities 7/14/2016       Patient Active Problem List   Diagnosis    History of MI (myocardial infarction) - acute inferior wall    CAD (coronary artery disease)    S/P primary angioplasty with coronary stent - RCA     Left ventricular dysfunction    Hyperlipidemia    Acute deep vein thrombosis (DVT) of distal vein of right lower extremity (HCC)    Venous stasis of both lower extremities    Spinal stenosis, lumbar region, with neurogenic claudication       Allergies   Allergen Reactions    Adhesive Tape        Current Facility-Administered Medications   Medication Dose Route Frequency Provider Last Rate Last Admin    allopurinol (ZYLOPRIM) tablet 300 mg  300 mg Oral Daily Ever [place], DO        atorvastatin (LIPITOR) tablet 20 mg  20 mg Oral QPM Ever [place], DO   20 mg at 11/09/22 2154    carvedilol (COREG) tablet 6.25 mg  6.25 mg Oral BID WC Ever [place], DO        vitamin D (CHOLECALCIFEROL) tablet 3,000 Units  3,000 Units Oral Daily Ever [place], DO        gabapentin (NEURONTIN) capsule 300 mg  300 mg Oral TID Ever [place], DO   300 mg at 11/09/22 2154    LORazepam (ATIVAN) tablet 1 mg  1 mg Oral Q6H PRN Ever [place], DO        sodium chloride flush 0.9 % injection 5-40 mL  5-40 mL IntraVENous 2 times per day Greenbrier Asper, DO   10 mL at 11/09/22 2154    sodium chloride flush 0.9 % injection 5-40 mL  5-40 mL IntraVENous PRN [place]rier Asper, DO        0.9 % sodium chloride infusion   IntraVENous PRN [place]rier Asper, DO        acetaminophen (TYLENOL) tablet 650 mg  650 mg Oral Q6H [place]rier Asper, DO   650 mg at 11/10/22 3346    promethazine (PHENERGAN) tablet 12.5 mg  12.5 mg Oral Q6H PRN [place]rier Asper, DO        Or    ondansetron [facility]) injection 4 mg  4 mg IntraVENous Q6H PRN Yuval Asper, DO        0.9 % sodium chloride infusion   IntraVENous Continuous Yuval Asper,  mL/hr at 11/10/22 0131 New Bag at 11/10/22 0131    ceFAZolin (ANCEF) 2,000 mg in sterile water 20 mL IV syringe  2,000 mg IntraVENous Q8H Yuval Asper, DO   2,000 mg at 11/10/22 0006    oxyCODONE (ROXICODONE) immediate release tablet 5 mg  5 mg Oral Q4H PRN [place]rier Asper, DO        Or    oxyCODONE HCl (OXY-IR) immediate release tablet 10 mg  10 mg Oral Q4H PRN Green[place]rier Asper, DO        HYDROmorphone (DILAUDID) injection 0.25 mg  0.25 mg IntraVENous Q3H PRN Greenbrier Asper, DO   0.25 mg at 11/10/22 0007    Or    HYDROmorphone (DILAUDID) injection 0.5 mg  0.5 mg IntraVENous Q3H PRN Greenbrier Asper, DO        polyethylene glycol (GLYCOLAX) packet 17 g  17 g Oral Daily Greenbrier Asper, DO        magnesium hydroxide (MILK OF MAGNESIA) 400 MG/5ML suspension 30 mL  30 mL Oral Daily PRN Greenbrier Asper, DO        bisacodyl (DULCOLAX) suppository 10 mg  10 mg Rectal Daily PRN [place]rier Asper, DO        benzocaine-menthol (CEPACOL SORE THROAT) lozenge 1 lozenge  1 lozenge Oral Q2H PRN Yuval Asper, DO        dexamethasone (DECADRON) injection 4 mg  4 mg IntraVENous Q6H Yuval Asper, DO   4 mg at 11/10/22 5323    tamsulosin (FLOMAX) capsule 0.4 mg  0.4 mg Oral Daily Yuval Fish DO           Social History     Socioeconomic History    Marital status:  Spouse name: Not on file    Number of children: Not on file    Years of education: Not on file    Highest education level: Not on file   Occupational History    Not on file   Tobacco Use    Smoking status: Never    Smokeless tobacco: Never   Vaping Use    Vaping Use: Never used   Substance and Sexual Activity    Alcohol use: No    Drug use: No    Sexual activity: Not on file   Other Topics Concern    Not on file   Social History Narrative    Not on file     Social Determinants of Health     Financial Resource Strain: Not on file   Food Insecurity: Not on file   Transportation Needs: Not on file   Physical Activity: Not on file   Stress: Not on file   Social Connections: Not on file   Intimate Partner Violence: Not on file   Housing Stability: Not on file       Family History   Problem Relation Age of Onset    Heart Attack Father        Review of Systems:   Heart: as above   Lungs: as above   Eyes: denies changes in vision or discharge. Ears: denies changes in hearing or pain. Nose: denies epistaxis or masses   Throat: denies sore throat or trouble swallowing. Neuro: denies numbness, tingling, tremors. Skin: denies rashes or itching. : denies hematuria, dysuria   GI: denies vomiting, diarrhea   Psych: denies mood changed, anxiety, depression. all others negative. Physical Exam   /64   Pulse 68   Temp 97.7 °F (36.5 °C) (Temporal)   Resp 16   Ht 5' 8\" (1.727 m)   Wt 174 lb (78.9 kg)   SpO2 98%   BMI 26.46 kg/m²   Constitutional: Oriented to person, place, and time. Well-developed and well-nourished. No distress. Head: Normocephalic and atraumatic. Eyes: EOM are normal. Pupils are equal, round, and reactive to light. Neck: Normal range of motion. Neck supple. No hepatojugular reflux and no JVD present. Carotid bruit is not present. No tracheal deviation present. No thyromegaly present. Cardiovascular: Normal rate, regular rhythm, normal heart sounds and intact distal pulses.   Exam reveals no gallop and no friction rub. No murmur heard. Pulmonary/Chest: Effort normal and breath sounds normal. No respiratory distress. No wheezes. No rales. No tenderness. Abdominal: Soft. Bowel sounds are normal. No distension and no mass. No tenderness. No rebound and no guarding. Musculoskeletal: Normal range of motion. No edema and no tenderness. Lymphadenopathy:   No cervical adenopathy. No groin adenopathy. Neurological: Alert and oriented to person, place, and time. Skin: Skin is warm and dry. No rash noted. Not diaphoretic. No erythema. Psychiatric: Normal mood and affect. Behavior is normal.     CBC:   Lab Results   Component Value Date/Time    WBC 7.2 11/10/2022 06:11 AM    RBC 3.89 11/10/2022 06:11 AM    HGB 10.7 11/10/2022 06:11 AM    HCT 33.3 11/10/2022 06:11 AM    MCV 85.6 11/10/2022 06:11 AM    MCH 27.5 11/10/2022 06:11 AM    MCHC 32.1 11/10/2022 06:11 AM    RDW 16.0 11/10/2022 06:11 AM     11/10/2022 06:11 AM    MPV 10.4 11/10/2022 06:11 AM     BMP:   Lab Results   Component Value Date/Time     11/10/2022 06:11 AM    K 4.4 11/10/2022 06:11 AM     11/10/2022 06:11 AM    CO2 25 11/10/2022 06:11 AM    BUN 24 11/10/2022 06:11 AM    LABALBU 3.7 06/20/2016 02:00 PM    LABALBU 4.2 02/09/2011 03:10 PM    CREATININE 0.8 11/10/2022 06:11 AM    CALCIUM 8.4 11/10/2022 06:11 AM    GFRAA >60 12/14/2017 04:20 AM    LABGLOM >60 11/10/2022 06:11 AM     Magnesium:  No results found for: MG  Cardiac Enzymes: No results found for: CKTOTAL, CKMB, CKMBINDEX, TROPHS   PT/INR:    Lab Results   Component Value Date/Time    PROTIME 12.5 11/08/2022 11:50 AM    PROTIME 11.4 02/09/2011 03:10 PM    INR 1.1 11/08/2022 11:50 AM     TSH:  No results found for: TSH    Stress Impression 10/19/2022:    ECG during the infusion did not change.    The myocardial perfusion imaging was abnormal.    The abnormality was a a large sized fixed defect in the inferior wall suggestive of a prior MI and a moderate sized partially reversible defect in the apex wall  Overall left ventricular systolic function was abnormal with regional wall motion abnormalities. Intermediate risk general pharmacologic stress test.     ASSESSMENT AND PLAN:  Patient Active Problem List   Diagnosis    History of MI (myocardial infarction) - acute inferior wall    CAD (coronary artery disease)    S/P primary angioplasty with coronary stent - RCA     Left ventricular dysfunction    Hyperlipidemia    Acute deep vein thrombosis (DVT) of distal vein of right lower extremity (HCC)    Venous stasis of both lower extremities    Spinal stenosis, lumbar region, with neurogenic claudication     1. Post-op:    Per surgery. 2. CAD:     Hx of inferior MI and PCI to RCA. Asymptomatic. Statin/BB. Restart ASA as able. 3. ICMP/Chronic systolic CHF:    LVEF 81% by stress 10/19/2022. Observe volume. 4. Lipids: Statin. 5. Hx of DVT    Chrissie Patterson D.O.   Cardiologist  Cardiology, [facility]

## 2022-11-10 NOTE — PROGRESS NOTES
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 Bed/bath is on 1st floor. Pt ambulated with no AD PTA. OBJECTIVE:   Initial Evaluation  Date: 11/10/22 Treatment Short Term/ Long Term   Goals   AM-PAC 6 Clicks      Was pt agreeable to Eval/treatment? yes     Does pt have pain? 6/10 LLE     Bed Mobility  Rolling: min A  Supine to sit: min A  Sit to supine: NT  Scooting: min A  Rolling: mod I  Supine to sit: mod I  Sit to supine: mod I  Scooting: mod I   Transfers Sit to stand: min A  Stand to sit: min A  Stand pivot: min A with ww  Sit to stand: mod I  Stand to sit: mod I  Stand pivot: mod I with AAD   Ambulation    50'x2 with ww min A  150'+ with AAD mod I   Stair negotiation: ascended and descended  NT  2 steps with 1 handrail mod I     Strength/ROM:   BLE grossly 4/5  BLE AROM WFL    Balance:   Static Sitting: SBA  Dynamic Sitting: CGA  Static Standing: CGA with ww  Dynamic Standing: min A with ww    Pt is A & O x 3  Sensation:  Pt denies numbness and tingling to extremities  Edema:  unremarkable    Therapeutic Exercises:    Bed mobility: supine<>sit, cued for EOB positioning  Transfers:  STSx1, cued for hand placement and postural correction  Ambulation: 50'x2 with ww  BLE AROM    Patient education  Pt educated on role of PT, importance of functional mobility during hospital stay, safety with functional mobility    Patient response to education:   Pt verbalized understanding Pt demonstrated skill Pt requires further education in this area   yes yes reinforce     ASSESSMENT:    Conditions Requiring Skilled Therapeutic Intervention:    [x]Decreased strength     []Decreased ROM  [x]Decreased functional mobility  [x]Decreased balance   [x]Decreased endurance   [x]Decreased posture  []Decreased sensation  []Decreased coordination   []Decreased vision  []Decreased safety awareness   [x]Increased pain       Comments:    Pt supine in bed upon entering, agreeable to participate. Pt's spouse present. Pt assisted with donning LSO prior to functional assessment. Pt cued for log rolling, using grab bars to complete roll. Pt was assisted with trunk to transfer to sitting position at bedside. Pt sitting upright with good static sitting balance, no c/o dizziness with position change. Pt was cued for hand placement and sequencing to stand from EOB. Pt standing with ww, demonstrating fair static standing balance. Pt was instructed to ambulate to tolerance. Pt completing fair distance with decreased antonia and uneven step length. Pt had c/o increased discomfort in LLE during bout. Pt was able to transfer to bedside chair at the end of session. All needs met and call bell in reach prior to exiting.     Treatment:  Patient practiced and was instructed in the following treatment:    Bed mobility training - pt given verbal and tactile cues to facilitate proper sequencing and safety during rolling and supine>sit as well as provided with physical assistance to complete task   STS and pivot transfer training - pt educated on proper hand and foot placement, safety and sequencing, and use of verbal and tactile cues to safely complete sit<>stand and pivot transfers with hands on assistance to complete task safely   Gait training- pt was given verbal and tactile cues to facilitate pt safety with ww use during ambulation as well as provided with physical assistance. Pt's/ family goals   1. Return home    Prognosis is good for reaching above PT goals. Patient and or family understand(s) diagnosis, prognosis, and plan of care. 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]  Spinal stenosis, lumbar region, with neurogenic claudication [M48.062]  Specific instructions for next treatment:  Progress as tolerated    Current Treatment Recommendations:     [x] Strengthening to improve independence with functional mobility   [] ROM to improve independence with functional mobility   [x] Balance Training to improve static/dynamic balance and to reduce fall risk  [x] Endurance Training to improve activity tolerance during functional mobility   [x] Transfer Training to improve safety and independence with all functional transfers   [x] Gait Training to improve gait mechanics, endurance and assess need for appropriate assistive device  [x] Stair Training in preparation for safe discharge home and/or into the community   [] Positioning to prevent skin breakdown and contractures  [x] Safety and Education Training   [x] Patient/Caregiver Education   [] HEP  [] Other     PT long term treatment goals are located in above grid    Frequency of treatments: 2-5x/week x 1-2 weeks. Time in  1405  Time out  1430    Total Treatment Time  10 minutes     Evaluation Time includes thorough review of current medical information, gathering information on past medical history/social history and prior level of function, completion of standardized testing/informal observation of tasks, assessment of data and education on plan of care and goals.     CPT codes:  [x] Low Complexity PT evaluation 04814  [] Moderate Complexity PT evaluation 65431  [] High Complexity PT evaluation [id number]  [] PT Re-evaluation [id number]  [] Gait training 19119 -- minutes  [] Manual therapy [id number] -- minutes  [x] Therapeutic activities 12632 10 minutes  [] Therapeutic exercises 90263 -- minutes  [] Neuromuscular reeducation 12265 -- minutes     Yan Jasso PT, DPT  [id number]

## 2022-11-10 NOTE — PROGRESS NOTES
INTRAOPERATIVE MONITORING EMG REPORT    Diagnosis: rotoscoliosis   Procedure: posterior fusion L3-S1 with laminectomy   Anesthesia: isoflurane  Surgeon: Kenya Eisenberg D.O    Intra-op Monitorin hours    Procedure:     EMG recording electrodes were placed over the vastus medialis, vastus lateralis, anterior tibialis, and medial gastrocnemius   musles for recording spontaneous EMG activity. A silent control was performed to test the integrity of the system prior to the procedure. Results:  Spontaneous EMG: was quiet throughout the surgical procedure.     Evoked EMG:   LEFT    Direct Nerve (mA)            Screw (mA)   L3                                                      >30  L4                                                      >30  L5  1   >30  S1     27      RIGHT     L3                                                      >30  L4                                                      >30  L5                                                      >30  S1     >30

## 2022-11-10 NOTE — PROGRESS NOTES
[address]      SZQN:                                                Patient Name: Johanne Patricia  MRN: [id number]  : 1940  Room: [address] [place] [address]     Evaluating OT:Rea Rogers OTR/L   License #  [id number]       Referring Provider: Lovely Mauricio DO    Specific Provider Orders/Date: OT evaluation & treatment        Diagnosis: Spinal Stenosis lumbar. Spondylolisthesis lumbar     Pertinent Medical History:  has a past medical history of Acute deep vein thrombosis (DVT) of distal vein of right lower extremity ([address] Acute MI, inferior wall ([address] Anxiety, Arthritis, CAD (coronary artery disease), Hip pain, Hyperlipidemia, Hypertension, Left ventricular dysfunction, Retention of urine, unspecified, and Venous stasis of both lower extremities. Surgery: 22: Laminectomy L3-S1 bilateral  Posterior instrumented fusion L3-S    Past Surgical History:  has a past surgical history that includes Diagnostic Cardiac Cath Lab Procedure (9/3/08); Coronary angioplasty (9/3/08); joint replacement (); Colonoscopy; Rotator cuff repair (3/1/2013); TURP (); skin biopsy; Knee Arthroplasty (14); hernia repair (2019); and lumbar fusion (N/A, 2022).        Precautions:  Fall Risk, neutral spine, LSO brace, drain, IV, aguiar      Assessment of current deficits    [x] Functional mobility            [x]ADLs           [x] Strength                  [x]Cognition    [x] Functional transfers          [x] IADLs         [x] Safety Awareness   [x]Endurance    [x] Fine Coordination                         [x] Balance      [] Vision/perception   [x]Sensation      []Gross Motor Coordination             [] ROM           [] Delirium                   [] Motor Control      OT PLAN OF CARE   OT POC based on physician orders, patient diagnosis and results of clinical assessment     Frequency/Duration: 2-4 days/wk for 2 weeks PRN   Specific OT Treatment Interventions to include: Instruction/training on adapted ADL techniques and AE recommendations to increase functional independence within precautions  Training on energy conservation strategies, correct breathing pattern and techniques to improve independence/tolerance for self-care routine  Functional transfer/mobility training/DME recommendations for increased independence, safety, and fall prevention  Patient/Family education to increase follow through with safety techniques and functional independence  Recommendation of environmental modifications for increased safety with functional transfers/mobility and ADLs  Cognitive retraining/development of therapeutic activities to improve problem solving, judgement, memory, and attention for increased safety/participation in ADL/IADL tasks  Visual-perceptual training to improve environmental scanning, visual attention/focus, and oculomotor skills for increased safety/independence with functional transfers/mobility and ADLs  Splinting/positioning for increased function, prevention of contractures, and improve skin integrity  Therapeutic exercise to improve motor endurance, ROM, and functional strength for ADLs/functional transfers  Therapeutic activities to facilitate/challenge dynamic balance, stand tolerance for increased safety and independence with ADLs  Therapeutic activities to facilitate gross/fine motor skills for increased independence with ADLs  Positioning to improve skin integrity, interaction with environment and functional independence     Recommended Adaptive Equipment:  TBD for A.E. Home Living: Pt lives with wife (retired RN) in a 1 story with 2 steps to enter with 1 HR. B&B on main level. Bathroom setup: walk in shower    Equipment owned: shower seat, ww     Prior Level of Function: Ind. with ADLs , Ind. with IADLs; ambulated no A.D.    Driving: active  Occupation: semi-retired, works as a matos     Pain Level: 6/10 ; L LE pain  Cognition: A&O: 4/4; Follows multi- step directions              Memory:  G              Sequencing:  G              Problem solving:  G              Judgement/safety:  F+                Functional Assessment:  AM-PAC Daily Activity Raw Score: 15/24    Initial Eval Status  Date: 11-10-22 Treatment Status  Date: STGs = LTGs  Time frame: 10-14 days   Feeding Ind. Mod I/ Ind   Grooming Set up seated   Modified [place]    UB Dressing Max A with gown & donning LSO brace. Pt. & wife instructed RE: brace instruction & precautions   Modified [place]    LB Dressing Max A with attempt figure 4 technique   Modified [place]    Bathing Mod A with sim. task   Modified [place]    Toileting NT, cosme   Modified [place]    Bed Mobility  Logroll: Min A  Supine to sit: Min A  Sit to supine:NT   Supine to sit: Modified [place]   Sit to supine: Modified [place]    Functional Transfers Min A with sit <> stand, SPT with ww   Modified [place]    Functional Mobility Min A with ww functional home distances x2    Modified [place]    Balance Sitting:     Static:  Sup    Dynamic: SBA  Standing: Min A       Activity Tolerance F   G   Visual/  Perceptual Glasses: readers          Vitals WFL    WFL      Hand Dominance R    AROM (PROM) Strength Additional Info:    RUE  R shld. to 100  WFL distal 4/5 good  and wfl FMC/dexterity noted during ADL tasks      LUE L shld. to 100  WFL distal 4/5 good  and wfl FMC/dexterity noted during ADL tasks         Hearing: [facility]   Sensation:  No c/o numbness or tingling B UE  Tone: WFL B UE  Edema: none noted B UE     Comments: Upon arrival patient supine in bed, wife present throughout, agreeable to OT, cleared by Nursing. Therapist facilitated bed mobility/ADL/functional transfers/mobility training with focus on safety, technique & precautions.   Pt. & wife Instructed RE: LSO brace, safe transfers/mobility, ADLs, role of OT, treatment plan, recs. , prec. At end of session, patient seated in bedside chair, all needs met, RN notified, with call light and phone within reach, all lines and tubes intact. Overall patient demonstrated decreased strength, balance, independence & safety during completion of ADL/functional transfer/mobility tasks. Pt would benefit from continued skilled OT to increase safety and independence with completion of ADL/IADL tasks for functional independence and quality of life. Treatment: OT treatment provided this date includes:   Instruction/training on safety and adapted techniques for completion of ADLs: to increase [place] in self care   Instruction/training on safe functional mobility/transfer techniques: with focus on safety, technique & precautions   Instruction/training on energy conservation/work simplification for completion of ADLs: techniques to increase [place] with self care ADLs & iADLs, work simplification to improve endurance   Proper Positioning/Alignment: for optimal healing, skin integrity to prevent breakdown, decrease edema  Skilled monitoring of vitals: to include BP, spO2 & HR during session  Sitting/standing Balance/Tolerance- to increased balance & activity tolerance during ADLs as well as facilitate proper posture and/or positioning. Rehab Potential: Good for established goals     Patient / Family Goal: to return home soon       Patient and/or family were instructed on functional diagnosis, prognosis/goals and OT plan of care. Demonstrated G understanding.       Eval Complexity: Low     Time In: 14:10  Time Out: 14:35  Total Treatment Time: 10    Min Units   OT Eval Low 51187  x     OT Eval Medium 52733       OT Eval High 70673       OT Re-Eval [id number]       Therapeutic Ex 54540       Therapeutic Activities 56650       ADL/Self Care 08989  10  1   Orthotic Management 59356       Manual [id number]       Neuro Re-Ed 55433 Non-Billable Time          Evaluation Time additionally includes thorough review of current medical information, gathering information on past medical history/social history and prior level of function, interpretation of standardized testing/informal observation of tasks, assessment of data and development of plan of care and goals. Rea Rogers, OTR/L   License #  [id number]

## 2022-11-10 NOTE — PROGRESS NOTES
Spine Progress Note      Doing well this morning. Pain controlled  Incisional pain varies from 5/10-9/10  Admits to occassional L LE pain overnight, none now  No R LE symptoms  Denies paresthesia/dysthesia LE  Catheter in place  Denies CP, SOB, N/V, H/A    H/H: 10.7/33.3  Alert and pleasant. Oriented X 3  Drsg intact with small amount sanguinous drainage cephalad dressing  Drain intact. Scant bloody drainage in container currently. Had 210cc output overnight  Motor intact. Left TA 4+/5, L EHL 4+/5  Sensation grossly intact L2-S1  1+ DP  Calf supple without tenderness Bilateral  Abd soft    A/P:  S/P Laminectomy and posterior Inst fusion L3-S1    - Mobilize  - Discontinue aguiar when mobile - has hx of BPH and urinary retention after surgery.   Started flomax yesterday  - Monitor Labs and drain output  - Pain control - current meds working well  - D/C Planning: Home vs rehab likely POD #3 or 4 depending on progress

## 2022-11-10 NOTE — BRIEF OP NOTE
Brief Postoperative Note      Patient: Nick Kiser  YOB: 1940  MRN: [id number]    Date of Procedure: 11/9/2022    Pre-Op Diagnosis: Lumbar Spinal stenosis. Lumbar Spondylolisthesis    Post-Op Diagnosis: Same      Procedure:  Laminectomy L3-S1 bilaterally  Posterior instrumented fusion L3-S1         Surgeon(s):  Murray Lara DO    Assistant:  Resident: Abdifatah De Los Santos DO; Aric Arrieta DO    Anesthesia: General    Estimated Blood Loss (mL): 032    Complications: None    Specimens:   * No specimens in log *    Implants:  Implant Name Type Inv. Item Serial No.  Lot No. LRB No. Used Action   VIASORB STRP [id number] - [id number]  VIASORB STRP [id number] [facility]  N/A 1 Implanted   VIASORB STRP [id number] - [id number]  VIASORB STRP [id number] [facility]  N/A 1 Implanted   VIASORB STRP [id number] - [id number]  VIASORB STRP [id number] [employer]  N/A 1 Implanted   SCREW SPNL L45MM DIA8. 5MM THORLUM THRD PREASSEMBLED FOR - [id number]  SCREW SPNL L45MM DIA8. 5MM THORLUM THRD PREASSEMBLED FOR  [employer]  N/A 2 Implanted   SCREW SPNL L50MM DIA7. 5MM THORLUM THRD PREASSEMBLED FOR - [id number]  SCREW SPNL L50MM DIA7. 5MM THORLUM THRD PREASSEMBLED FOR  [employer]  N/A 3 Implanted   SCREW SPNL L45MM DIA7. 5MM THORLUM PEDCL NONCANNULATED HEIDY - [id number]  SCREW SPNL L45MM DIA7. 5MM THORLUM PEDCL NONCANNULATED HEIDY  [employer]  N/A 3 Implanted   CAP SPNL THORLUM HEIDY THRD CREO - [id number]  CAP SPNL THORLUM HEIDY THRD CREO  [employer]  N/A 8 Implanted   GRAFT BONE SUB 10CC DEMIN BONE MTRX PLUSXEMPLIFI - [id number]  GRAFT BONE SUB 10CC DEMIN BONE [id number] [facility] [id number] [address] [place] [address]-  N/A 1 Implanted   SYSTEM SEAL 5ML SPINE DURA HYDRGEL POLYETH GLYCOL - [id number]  SYSTEM SEAL 5ML SPINE DURA HYDRGEL POLYETH GLYCOL  [employer] [facility]-WD [id number] N/A 1 Implanted   PRINCE SPNL L90MM DIA5.5MM TI ALLY CRV CREO - [id number]  PRINCE SPNL L90MM DIA5. 5MM TI ALLY CRV CREO  [employer]-  N/A 2 Implanted         Drains:   Closed/Suction Drain Inferior Back Other (Comment) (Active)   Site Description Clean, dry & intact 11/09/22 1930   Dressing Status New dressing applied 11/09/22 1930       Urinary Catheter 11/09/22 2 Way (Active)       Electronically signed by Chadd Calixto DO on 11/9/2022 at 7:42 PM

## 2022-11-10 NOTE — OP NOTE
Operative Note      Patient: Emmie Montenegro  YOB: 1940  MRN: [id number]    Date of Procedure: 11/9/2022    Pre-Op Diagnosis: Spinal Stenosis lumbar. Spondylolisthesis lumbar    Post-Op Diagnosis: Same      Procedure:  Laminectomy L3-S1 bilateral  Posterior instrumented fusion L3-S1         Surgeon(s):  Mc Cloud DO    Assistant:   Resident: Orin Delgadillo DO; Bg Ruiz DO    Anesthesia: General    Estimated Blood Loss (mL): 324    Complications: None    Specimens:   * No specimens in log *    Implants:  Implant Name Type Inv. Item Serial No.  Lot No. LRB No. Used Action   VIASORB STRP [id number] - [id number]  VIASORB STRP [id number] [facility]  N/A 1 Implanted   VIASORB STRP [id number] - [id number]  VIASORB STRP [id number] [facility]  N/A 1 Implanted   VIASORB STRP [id number] - [id number]  VIASORB STRP [id number] [facility]  N/A 1 Implanted   SCREW SPNL L45MM DIA8. 5MM THORLUM THRD PREASSEMBLED FOR - [id number]  SCREW SPNL L45MM DIA8. 5MM THORLUM THRD PREASSEMBLED FOR  [facility]  N/A 2 Implanted   SCREW SPNL L50MM DIA7. 5MM THORLUM THRD PREASSEMBLED FOR - [id number]  SCREW SPNL L50MM DIA7. 5MM THORLUM THRD PREASSEMBLED FOR  [facility]  N/A 3 Implanted   SCREW SPNL L45MM DIA7. 5MM THORLUM PEDCL NONCANNULATED HEIDY - [id number]  SCREW SPNL L45MM DIA7. 5MM THORLUM PEDCL NONCANNULATED HEIDY  [facility]  N/A 3 Implanted   CAP SPNL THORLUM HEIDY THRD CREO - [id number]  CAP SPNL THORLUM HEIDY THRD CREO  [facility]  N/A 8 Implanted   GRAFT BONE SUB 10CC DEMIN BONE MTRX PLUSXEMPLIFI - [id number]  GRAFT BONE SUB 10CC DEMIN BONE 70616 [facility] [id number] [address] [place] [address]  N/A 1 Implanted   SYSTEM SEAL 5ML SPINE DURA HYDRGEL POLYETH GLYCOL - [id number]  SYSTEM SEAL 5ML SPINE DURA HYDRGEL POLYETH GLYCOL  [employer] [facility]-WD [id number] N/A 1 Implanted   PRINCE SPNL L90MM [address] 5MM TI MARILEE SANCHEZ CREO - [id number]  PRINCE SPNL L90MM DIA5. 5MM TI MARILEE SANCHEZ CREO  [employer] [facility]-  N/A 2 Implanted         Drains:   Urinary Catheter 11/09/22 2 Way (Active)       Detailed Description of Procedure:   Patient was seen and examined in preoperative holding area. Patient's wife was present. Surgery was reviewed and agreed upon. We again reviewed risks to the surgery and all questions were answered. Patient surgical site was then marked and was able to be transported to operative suite. Patient underwent general endotracheal intubation without complication. Patient was then transferred to the [facility] table and all bony prominences were well-padded and protected. Patient received prophylactic preoperative antibiotics. Surgical timeout was then performed. Midline incision was then made from the upper portion of the L3 spinous process to the sacrum. Sharp dissection and cautery was used to dissect down to thoracolumbar fascia. The thoracolumbar fascia was incised in line with the incision. Subperiosteal dissection was then carried out from L3 lamina to the sacrum. Fluoroscopy was then used to determine location for laminectomy. Drena Battiest was used to in the interspinous space. Fluoroscopy showed that this was at the L3-4 interspinous space. Laminectomy was then carried out from L3 to the sacrum. This was performed using Leksell rongeur, Kerrison, and bur. A ball-tipped probe was used frequently to separate the ligamentum flavum from the bony structures and Wide decompressive laminectomy was carried out bilaterally from L3-S1. Laminectomy was completed when ball-tipped probe was easily passed out the neuroforamen from L3-S1 on bilateral sides. Facet cyst was noted at the left side L5-S1. This was gently resected. There was scar tissue adhered to the L5 nerve root on the left and a tear in the nerve root sleeve was noticed. This was repaired at the base with Nurolon suture. No CSF was seen. interrupted fashion for subcutaneous closure. A 3 oh strata fix was then used in running fashion for subcuticular closure. Dry sterile dressing was then placed. Patient was unable to be transferred from the OR table to hospital bed and extubated by anesthesia without complication. He was then transferred to recovery room in stable condition. Postop:  Activity as tolerates  Discontinue Cason catheter once mobilizing  Pain control  Consult to medicine to assist with medical management and consult to cardiology as patient known to Dr. Tino Richardson group  Discharge planning-likely home versus rehab postop day 3 but will depend on progress.     Electronically signed by Miguel Cerrato DO on 11/9/2022 at 7:23 PM

## 2022-11-10 NOTE — PROGRESS NOTES
INTRAOPERATIVE SSEP REPORT      Date of Surgery: 2022   Diagnosis: rotoscoliosis  Procedure: L3-S1 posterior fusion and laminectomy   Anesthesia: isoflurane   Surgeon:  Yuval Fish DO    Intra-op Monitorin hours    Somatosensory evoked potentials (SSEPs) for ulnar and tibial nerve stimulation were used to monitor upper and lower limb function during surgery. Responses were elicited at the aforementioned sites and were recorded peripherally, cervically and over the somatosensory cortex. Standard elicitation and recording parameters were used. Pre-incision responses were present from all recording sites. Baseline data for upper and lower limb SSEPs were obtained. During the surgical procedure the left leg fluctuated in amplitude. It would fluctuate betweeen near baseline to  50 % reduced in amplitude. At closing the left lower cortical SSEP ampliude was reduced near 50 % . Lower subcortical responses were noisy and difficult to identify. All other SSEP responses were comparable to baseline. The surgeon was informed of the status of all responses.           _____________________________________  Jam Manzo M.D., Interpreting Physician

## 2022-11-11 LAB
HCT VFR BLD CALC: 30.5 % (ref 37–54)
HEMOGLOBIN: 9.8 G/DL (ref 12.5–16.5)
MCH RBC QN AUTO: 27.6 PG (ref 26–35)
MCHC RBC AUTO-ENTMCNC: 32.1 % (ref 32–34.5)
MCV RBC AUTO: 85.9 FL (ref 80–99.9)
PDW BLD-RTO: 16.1 FL (ref 11.5–15)
PLATELET # BLD: 177 E9/L (ref 130–450)
PMV BLD AUTO: 10.3 FL (ref 7–12)
RBC # BLD: 3.55 E12/L (ref 3.8–5.8)
WBC # BLD: 9 E9/L (ref 4.5–11.5)

## 2022-11-11 PROCEDURE — 2580000003 HC RX 258: Performed by: ORTHOPAEDIC SURGERY

## 2022-11-11 PROCEDURE — 6370000000 HC RX 637 (ALT 250 FOR IP): Performed by: ORTHOPAEDIC SURGERY

## 2022-11-11 PROCEDURE — 36415 COLL VENOUS BLD VENIPUNCTURE: CPT

## 2022-11-11 PROCEDURE — 6370000000 HC RX 637 (ALT 250 FOR IP): Performed by: INTERNAL MEDICINE

## 2022-11-11 PROCEDURE — 97530 THERAPEUTIC ACTIVITIES: CPT

## 2022-11-11 PROCEDURE — 97116 GAIT TRAINING THERAPY: CPT

## 2022-11-11 PROCEDURE — 85027 COMPLETE CBC AUTOMATED: CPT

## 2022-11-11 PROCEDURE — 1200000000 HC SEMI PRIVATE

## 2022-11-11 PROCEDURE — 99233 SBSQ HOSP IP/OBS HIGH 50: CPT | Performed by: INTERNAL MEDICINE

## 2022-11-11 PROCEDURE — 97535 SELF CARE MNGMENT TRAINING: CPT

## 2022-11-11 RX ORDER — OXYCODONE HYDROCHLORIDE AND ACETAMINOPHEN 5; 325 MG/1; MG/1
1 TABLET ORAL EVERY 6 HOURS PRN
Qty: 28 TABLET | Refills: 0 | Status: SHIPPED | OUTPATIENT
Start: 2022-11-11 | End: 2022-11-13 | Stop reason: HOSPADM

## 2022-11-11 RX ORDER — MECOBALAMIN 5000 MCG
10 TABLET,DISINTEGRATING ORAL NIGHTLY PRN
Status: DISCONTINUED | OUTPATIENT
Start: 2022-11-11 | End: 2022-11-13 | Stop reason: HOSPADM

## 2022-11-11 RX ADMIN — ACETAMINOPHEN 650 MG: 325 TABLET, FILM COATED ORAL at 22:13

## 2022-11-11 RX ADMIN — ATORVASTATIN CALCIUM 20 MG: 20 TABLET, FILM COATED ORAL at 18:51

## 2022-11-11 RX ADMIN — GABAPENTIN 300 MG: 300 CAPSULE ORAL at 13:56

## 2022-11-11 RX ADMIN — GABAPENTIN 300 MG: 300 CAPSULE ORAL at 08:30

## 2022-11-11 RX ADMIN — POLYETHYLENE GLYCOL 3350 17 G: 17 POWDER, FOR SOLUTION ORAL at 08:30

## 2022-11-11 RX ADMIN — ACETAMINOPHEN 650 MG: 325 TABLET, FILM COATED ORAL at 08:30

## 2022-11-11 RX ADMIN — ALLOPURINOL 300 MG: 300 TABLET ORAL at 08:30

## 2022-11-11 RX ADMIN — LORAZEPAM 1 MG: 1 TABLET ORAL at 22:13

## 2022-11-11 RX ADMIN — CARVEDILOL 6.25 MG: 6.25 TABLET, FILM COATED ORAL at 08:30

## 2022-11-11 RX ADMIN — Medication 3000 UNITS: at 08:30

## 2022-11-11 RX ADMIN — LORAZEPAM 1 MG: 1 TABLET ORAL at 08:30

## 2022-11-11 RX ADMIN — SODIUM CHLORIDE: 9 INJECTION, SOLUTION INTRAVENOUS at 00:16

## 2022-11-11 RX ADMIN — ACETAMINOPHEN 650 MG: 325 TABLET, FILM COATED ORAL at 16:05

## 2022-11-11 RX ADMIN — TAMSULOSIN HYDROCHLORIDE 0.4 MG: 0.4 CAPSULE ORAL at 08:30

## 2022-11-11 RX ADMIN — GABAPENTIN 300 MG: 300 CAPSULE ORAL at 22:13

## 2022-11-11 RX ADMIN — SODIUM CHLORIDE, PRESERVATIVE FREE 10 ML: 5 INJECTION INTRAVENOUS at 22:15

## 2022-11-11 RX ADMIN — Medication 10 MG: at 22:13

## 2022-11-11 ASSESSMENT — PAIN DESCRIPTION - LOCATION: LOCATION: BACK

## 2022-11-11 ASSESSMENT — PAIN SCALES - GENERAL
PAINLEVEL_OUTOF10: 4

## 2022-11-11 ASSESSMENT — PAIN DESCRIPTION - PAIN TYPE: TYPE: SURGICAL PAIN

## 2022-11-11 ASSESSMENT — PAIN DESCRIPTION - DESCRIPTORS: DESCRIPTORS: ACHING;CRAMPING;SORE

## 2022-11-11 NOTE — PROGRESS NOTES
Department of Orthopedic Surgery  Resident Progress Note    Patient seen and examined. Pain improved, 4/10. No new complaints. Denies chest pain, shortness of breath, dizziness/lightheadedness. Denies headache, lightheadedness. Walked 50 ft x2 with ww yesterday. AM-PAC 16/24. VITALS:  /69   Pulse 71   Temp 97.1 °F (36.2 °C) (Temporal)   Resp 16   Ht 5' 8\" (1.727 m)   Wt 174 lb (78.9 kg)   SpO2 99%   BMI 26.46 kg/m²     General: alert and oriented to person, place and time, well-developed and well-nourished, in no acute distress    MUSCULOSKELETAL:   Back and bilateral lower extremity:  Dressing C/D/I, small area of cephalad saturation, unchanged  Drain intact, 40-50cc in container currently. Had 125 out in the last 24 hrs.   Motor intact, L TA 4/5, L EHL 4/5  +1/4 DP & PT pulses, Brisk Cap refill, Toes warm and perfused  Distal sensation grossly L2-S2  Abd soft    CBC:   Lab Results   Component Value Date/Time    WBC 7.2 11/10/2022 06:11 AM    HGB 10.7 11/10/2022 06:11 AM    HCT 33.3 11/10/2022 06:11 AM     11/10/2022 06:11 AM     PT/INR:    Lab Results   Component Value Date/Time    PROTIME 12.5 11/08/2022 11:50 AM    PROTIME 11.4 02/09/2011 03:10 PM    INR 1.1 11/08/2022 11:50 AM           ASSESSMENT  S/P Laminectomy and posterior instrumented fusion L3-S1 - 11/9/22    PLAN      Continue physical therapy and protocol: WBAT - BLE  Continue flomax  Hold DVT prophy at this time, will resume tomorrow   PT/OT  Pain Control: IV and PO, wean to orals as able   Monitor H&H  D/C Plan:  Home vs rehad, likely POD3 vs 4

## 2022-11-11 NOTE — PROGRESS NOTES
CHIEF COMPLAINT: Post-op/CAD/ICMP    HISTORY OF PRESENT ILLNESS: Patient is a 81 y.o. male seen at the request of Leroy Tee MD and followed at our office by Dr. Sharath Weller. Consult for assistance with post-op management of patient. Significant cardiac history. Tolerated procedure well. No CP or SOB.      Past Medical History:   Diagnosis Date    Acute deep vein thrombosis (DVT) of distal vein of right lower extremity ([address] 7/14/2016    Acute MI, inferior wall ([address] 9/3/08    Anxiety     Arthritis     osteo    CAD (coronary artery disease)     follows with Dr. Hiwot Clark    Hip pain     Hyperlipidemia     Hypertension     Left ventricular dysfunction     Retention of urine, unspecified     Venous stasis of both lower extremities 7/14/2016       Patient Active Problem List   Diagnosis    History of MI (myocardial infarction) - acute inferior wall    CAD (coronary artery disease)    S/P primary angioplasty with coronary stent - RCA     Left ventricular dysfunction    Hyperlipidemia    Acute deep vein thrombosis (DVT) of distal vein of right lower extremity (HCC)    Venous stasis of both lower extremities    Spinal stenosis, lumbar region, with neurogenic claudication       Allergies   Allergen Reactions    Adhesive Tape        Current Facility-Administered Medications   Medication Dose Route Frequency Provider Last Rate Last Admin    LORazepam (ATIVAN) tablet 1 mg  1 mg Oral BID Leroy Tee MD   1 mg at 11/11/22 0830    allopurinol (ZYLOPRIM) tablet 300 mg  300 mg Oral Daily Ardie Diones, DO   300 mg at 11/11/22 0830    atorvastatin (LIPITOR) tablet 20 mg  20 mg Oral QPM Ardie Diones, DO   20 mg at 11/10/22 1700    carvedilol (COREG) tablet 6.25 mg  6.25 mg Oral BID WC Ardie Diones, DO   6.25 mg at 11/11/22 0830    vitamin D (CHOLECALCIFEROL) tablet 3,000 Units  3,000 Units Oral Daily Ardie Diones, DO   3,000 Units at 11/11/22 0830    gabapentin (NEURONTIN) capsule 300 mg  300 mg Oral TID Lovely Ramal, DO   300 mg at 11/11/22 0830    sodium chloride flush 0.9 % injection 5-40 mL  5-40 mL IntraVENous 2 times per day Lovely Ramal, DO   10 mL at 11/10/22 2103    sodium chloride flush 0.9 % injection 5-40 mL  5-40 mL IntraVENous PRN Lovely Ramal, DO        0.9 % sodium chloride infusion   IntraVENous PRN Lovely Ramal, DO        acetaminophen (TYLENOL) tablet 650 mg  650 mg Oral Q6H Lovely Ramal, DO   650 mg at 11/11/22 0830    promethazine (PHENERGAN) tablet 12.5 mg  12.5 mg Oral Q6H PRN Lovely Ramal, DO        Or    ondansetron [facility]) injection 4 mg  4 mg IntraVENous Q6H PRN Lovely Ramal, DO        0.9 % sodium chloride infusion   IntraVENous Continuous Lovely Ramal,  mL/hr at 11/11/22 0016 New Bag at 11/11/22 0016    oxyCODONE (ROXICODONE) immediate release tablet 5 mg  5 mg Oral Q4H PRN Lovely Ramal, DO        Or    oxyCODONE HCl (OXY-IR) immediate release tablet 10 mg  10 mg Oral Q4H PRN Lovely Ramal, DO        HYDROmorphone (DILAUDID) injection 0.25 mg  0.25 mg IntraVENous Q3H PRN Lovely Ramal, DO   0.25 mg at 11/10/22 0007    Or    HYDROmorphone (DILAUDID) injection 0.5 mg  0.5 mg IntraVENous Q3H PRN Lovely Ramal, DO        polyethylene glycol (GLYCOLAX) packet 17 g  17 g Oral Daily Lovely Ramal, DO   17 g at 11/11/22 0830    magnesium hydroxide (MILK OF MAGNESIA) 400 MG/5ML suspension 30 mL  30 mL Oral Daily PRN Lovely Ramal, DO        bisacodyl (DULCOLAX) suppository 10 mg  10 mg Rectal Daily PRN Lovely Ramal, DO        benzocaine-menthol (CEPACOL SORE THROAT) lozenge 1 lozenge  1 lozenge Oral Q2H PRN Lovely Ramal, DO        tamsulosin [facility]) capsule 0.4 mg  0.4 mg Oral Daily Lovely Ramal, DO   0.4 mg at 11/11/22 0830       Social History     Socioeconomic History    Marital status:      Spouse name: Not on file    Number of children: Not on file    Years of education: Not on file    Highest education level: Not on file   Occupational History    Not on file   Tobacco Use    Smoking status: Never    Smokeless tobacco: Never   Vaping Use    Vaping Use: Never used   Substance and Sexual Activity    Alcohol use: No    Drug use: No    Sexual activity: Not on file   Other Topics Concern    Not on file   Social History Narrative    Not on file     Social Determinants of Health     Financial Resource Strain: Not on file   Food Insecurity: Not on file   Transportation Needs: Not on file   Physical Activity: Not on file   Stress: Not on file   Social Connections: Not on file   Intimate Partner Violence: Not on file   Housing Stability: Not on file       Family History   Problem Relation Age of Onset    Heart Attack Father        Review of Systems:   Heart: as above   Lungs: as above   Eyes: denies changes in vision or discharge. Ears: denies changes in hearing or pain. Nose: denies epistaxis or masses   Throat: denies sore throat or trouble swallowing. Neuro: denies numbness, tingling, tremors. Skin: denies rashes or itching. : denies hematuria, dysuria   GI: denies vomiting, diarrhea   Psych: denies mood changed, anxiety, depression. all others negative. Physical Exam   BP (!) 118/57   Pulse 76   Temp 97.7 °F (36.5 °C) (Temporal)   Resp 16   Ht 5' 8\" (1.727 m)   Wt 174 lb (78.9 kg)   SpO2 99%   BMI 26.46 kg/m²   Constitutional: Oriented to person, place, and time. Well-developed and well-nourished. No distress. Head: Normocephalic and atraumatic. Eyes: EOM are normal. Pupils are equal, round, and reactive to light. Neck: Normal range of motion. Neck supple. No hepatojugular reflux and no JVD present. Carotid bruit is not present. No tracheal deviation present. No thyromegaly present. Cardiovascular: Normal rate, regular rhythm, normal heart sounds and intact distal pulses. Exam reveals no gallop and no friction rub. No murmur heard.   Pulmonary/Chest: Effort normal and breath sounds normal. No respiratory distress. No wheezes. No rales. No tenderness. Abdominal: Soft. Bowel sounds are normal. No distension and no mass. No tenderness. No rebound and no guarding. Musculoskeletal: Normal range of motion. No edema and no tenderness. Lymphadenopathy:   No cervical adenopathy. No groin adenopathy. Neurological: Alert and oriented to person, place, and time. Skin: Skin is warm and dry. No rash noted. Not diaphoretic. No erythema. Psychiatric: Normal mood and affect. Behavior is normal.     CBC:   Lab Results   Component Value Date/Time    WBC 9.0 11/11/2022 07:43 AM    RBC 3.55 11/11/2022 07:43 AM    HGB 9.8 11/11/2022 07:43 AM    HCT 30.5 11/11/2022 07:43 AM    MCV 85.9 11/11/2022 07:43 AM    MCH 27.6 11/11/2022 07:43 AM    MCHC 32.1 11/11/2022 07:43 AM    RDW 16.1 11/11/2022 07:43 AM     11/11/2022 07:43 AM    MPV 10.3 11/11/2022 07:43 AM     BMP:   Lab Results   Component Value Date/Time     11/10/2022 06:11 AM    K 4.4 11/10/2022 06:11 AM     11/10/2022 06:11 AM    CO2 25 11/10/2022 06:11 AM    BUN 24 11/10/2022 06:11 AM    LABALBU 3.7 06/20/2016 02:00 PM    LABALBU 4.2 02/09/2011 03:10 PM    CREATININE 0.8 11/10/2022 06:11 AM    CALCIUM 8.4 11/10/2022 06:11 AM    GFRAA >60 12/14/2017 04:20 AM    LABGLOM >60 11/10/2022 06:11 AM     Magnesium:  No results found for: MG  Cardiac Enzymes: No results found for: CKTOTAL, CKMB, CKMBINDEX, TROPHS   PT/INR:    Lab Results   Component Value Date/Time    PROTIME 12.5 11/08/2022 11:50 AM    PROTIME 11.4 02/09/2011 03:10 PM    INR 1.1 11/08/2022 11:50 AM     TSH:  No results found for: TSH    Stress Impression 10/19/2022:    ECG during the infusion did not change.    The myocardial perfusion imaging was abnormal.    The abnormality was a a large sized fixed defect in the inferior wall suggestive of a prior MI and a moderate sized partially reversible defect in the apex wall  Overall left ventricular systolic function was abnormal with regional wall motion abnormalities. Intermediate risk general pharmacologic stress test.     ASSESSMENT AND PLAN:  Patient Active Problem List   Diagnosis    History of MI (myocardial infarction) - acute inferior wall    CAD (coronary artery disease)    S/P primary angioplasty with coronary stent - RCA     Left ventricular dysfunction    Hyperlipidemia    Acute deep vein thrombosis (DVT) of distal vein of right lower extremity (HCC)    Venous stasis of both lower extremities    Spinal stenosis, lumbar region, with neurogenic claudication     1. Post-op:    Per surgery. 2. CAD:     Hx of inferior MI and PCI to RCA. Asymptomatic. Statin/BB. Restart ASA as able. 3. ICMP/Chronic systolic CHF:    LVEF 22% by stress 10/19/2022. Observe volume. 4. Lipids: Statin. 5. Hx of DVT    Patient stable from CV standpoint. Please call if needed. TYVM. Chelo Alberto D.O.   Cardiologist  Cardiology, [facility]

## 2022-11-11 NOTE — PROGRESS NOTES
Doing very well   Minimal pain   Tolerating increased activity  RRR  Lungs clear  Continue present RX

## 2022-11-11 NOTE — PROGRESS NOTES
Physician Progress Note      PATIENTLoradriane Castillo  [facility] #:                  [id number]  :                       1940  ADMIT DATE:       2022 9:22 AM  DISCH DATE:  RESPONDING  PROVIDER #: Barbara Mcdonald DO          QUERY TEXT:    Pt admitted with Spinal Stenosis and  Spondylolisthesis lumbar region and   underwent bilateral Laminectomy L3-S1, and Posterior instrumented fusion L3-S1   on 2022. Noted to have L5 tear in the nerve root sleeve that required   repair with Nurolon suture. ? If possible, please document in progress notes   and discharge summary:    The medical record reflects the following:  Risk Factors: Spinal Stenosis lumbar. Spondylolisthesis lumbar  Clinical Indicators:  Op note- Laminectomy was completed when ball-tipped   probe was easily passed out the neuroforamen from L3-S1 on bilateral sides. Facet cyst was noted at the left side L5-S1. This was gently resected. There   was scar tissue adhered to the L5 nerve root on the left and a tear in the   nerve root sleeve was noticed. This was repaired at the base with Nurolon   suture. No CSF was seen. The wound was then copiously irrigated. Examination   of the wound bed showed good hemostasis and no sign of any CSF leak or dural   tear.   Repair of the small nerve root sle  Treatment: s/p Laminectomy L3-S1 bilateral, Posterior instrumented fusion   L3-S1    Thank you,  Cata Mota RN, CRCR  Clinical Documentation Improvement  Options provided:  -- Clinically significant  L5 tear in the nerve root sleeve  occurred   intraoperatively and required repair at the base with Nurolon suture  -- Clinically insignificant  L5 tear in the nerve root sleeve  did not alter   the course of surgery  -- Other - I will add my own diagnosis  -- Disagree - Not applicable / Not valid  -- Disagree - Clinically unable to determine / Unknown  -- Refer to Clinical Documentation Reviewer    PROVIDER RESPONSE TEXT:    Patient had a clinically insignificant L5 tear in the nerve root sleeve that   did not alter the course of surgery.     Query created by: Evelin Young on 11/10/2022 11:35 AM      Electronically signed by:  Robin Fernandez DO 11/10/2022 10:20 PM

## 2022-11-11 NOTE — PROGRESS NOTES
Spine Progress Note    Up in chair  Has done very well  Pain controlled  Had instance of left lateral thigh pain earlier, but improved now  No LE N/T. No Current LE pain  Neurogenic claudication symptoms improved from prior to surgery  No h/a, CP, SOB, n/v  No BM, + Flatus. No abd pain      Up in chair, A&O, NAD  Drsg intact  Drain intact.   Scant bloody drainage at present  Motor/Sensory without gross deficit  1+ DP  Claf supple, nontender  Abd soft, nontender    A/P:  S/P Lumbar decompression/Fusion 11/9  Acute Blood loss anemia (hgb 12.0 to 9.8)    Continue to mobilize  Monitor H/H  Likely D/C Drain and begin daily dressing changes tomorrow if drain continue to subside output  Patient requesting Melatonin for sleep  Okay to use LSO as needed  Patient planning for home with home health care Sunday 11/13

## 2022-11-11 NOTE — PLAN OF CARE

## 2022-11-11 NOTE — PROGRESS NOTES
Occupational Therapy  OT BEDSIDE TREATMENT NOTE   [facility] [address] [facility]  [address] [place] [address]        AENV:  Patient Name: Jasmeet Lugo  MRN: [id number]  : 1940  [id number]     Per OT Eval:    Evaluating OT:Rea Rogers, OTR/L   License #  [id number]        Referring Provider: Ines Cano DO    Specific Provider Orders/Date: OT evaluation & treatment        Diagnosis: Spinal Stenosis lumbar. Spondylolisthesis lumbar     Pertinent Medical History:  has a past medical history of Acute deep vein thrombosis (DVT) of distal vein of right lower extremity ([address] Acute MI, inferior wall ([address] Anxiety, Arthritis, CAD (coronary artery disease), Hip pain, Hyperlipidemia, Hypertension, Left ventricular dysfunction, Retention of urine, unspecified, and Venous stasis of both lower extremities. Surgery: 22: Laminectomy L3-S1 bilateral  Posterior instrumented fusion L3-S    Past Surgical History:  has a past surgical history that includes Diagnostic Cardiac Cath Lab Procedure (9/3/08); Coronary angioplasty (9/3/08); joint replacement (); Colonoscopy; Rotator cuff repair (3/1/2013); TURP (); skin biopsy; Knee Arthroplasty (14); hernia repair (2019); and lumbar fusion (N/A, 2022).        Precautions:  Fall Risk, neutral spine, LSO brace, drain, IV, aguiar      Assessment of current deficits    [x] Functional mobility            [x]ADLs           [x] Strength                  [x]Cognition    [x] Functional transfers          [x] IADLs         [x] Safety Awareness   [x]Endurance    [x] Fine Coordination                         [x] Balance      [] Vision/perception   [x]Sensation      []Gross Motor Coordination             [] ROM           [] Delirium                   [] Motor Control      OT PLAN OF CARE   OT POC based on physician orders, patient diagnosis and results of clinical assessment Frequency/Duration: 2-4 days/wk for 2 weeks PRN   Specific OT Treatment Interventions to include: Instruction/training on adapted ADL techniques and AE recommendations to increase functional independence within precautions  Training on energy conservation strategies, correct breathing pattern and techniques to improve independence/tolerance for self-care routine  Functional transfer/mobility training/DME recommendations for increased independence, safety, and fall prevention  Patient/Family education to increase follow through with safety techniques and functional independence  Recommendation of environmental modifications for increased safety with functional transfers/mobility and ADLs  Cognitive retraining/development of therapeutic activities to improve problem solving, judgement, memory, and attention for increased safety/participation in ADL/IADL tasks  Visual-perceptual training to improve environmental scanning, visual attention/focus, and oculomotor skills for increased safety/independence with functional transfers/mobility and ADLs  Splinting/positioning for increased function, prevention of contractures, and improve skin integrity  Therapeutic exercise to improve motor endurance, ROM, and functional strength for ADLs/functional transfers  Therapeutic activities to facilitate/challenge dynamic balance, stand tolerance for increased safety and independence with ADLs  Therapeutic activities to facilitate gross/fine motor skills for increased independence with ADLs  Positioning to improve skin integrity, interaction with environment and functional independence     Recommended Adaptive Equipment:  TBD for A.E. Home Living: Pt lives with wife (retired RN) in a 1 story with 2 steps to enter with 1 HR. B&B on main level. Bathroom setup: walk in shower    Equipment owned: shower seat, ww     Prior Level of Function: Ind. with ADLs , Ind. with IADLs; ambulated no A.D.    Driving: active  Occupation: semi-retired, works as a matos     Pain Level: Pt did not complain of pain this session  Cognition: A&O: 4/4; Follows multi- step directions              Memory:  G              Sequencing:  G              Problem solving:  G              Judgement/safety:  F+                Functional Assessment:  AM-PAC Daily Activity Raw Score: 16/24    Initial Eval Status  Date: 11-10-22 Treatment Status  Date: 11/11/22 STGs = LTGs  Time frame: 10-14 days   Feeding Ind. Independent Mod I/ Ind   Grooming Set up seated  Setup  Pt washed face, applied deodorant seated upright in chair Modified [place]    UB Dressing Max A with gown & donning LSO brace. Pt. & wife instructed RE: brace instruction & precautions  Hoa  Ciaran/doff hospital gown seated. Assistance to place brace around back, pt then able to fasten/tighten with max cueing Modified [place]    LB Dressing Max A with attempt figure 4 technique  [place]oa-socks  Pt donned/doffed hospital socks with use of reacher and sockaide seated upright in chair    DNT pants this date Modified [place]    Bathing Mod A with sim. task  modA  Simulated Task    Pt stating already getting washed up this AM Modified [place]    Toileting NT, aguiar  modA  Pt urinated seated on standard commode, assistance to transfer with use of grab bar and manage of pants Modified [place]    Bed Mobility  Logroll: Min A  Supine to sit: Min A  Sit to supine:NT  DNT  Hoa per previous session    Pt seated on commode upon arrival and upright in chair at end of session Supine to sit: Modified [place]   Sit to supine: Modified [place]    Functional Transfers Min A with sit <> stand, SPT with ww  Hoa  Sit to Stand  Stand to Sit     Cueing for hand placement Modified [place]    Functional Mobility Min A with ww functional home distances x2   CGA  Pt ambulated short household distance in room Bathroom<>Chair with w.w.  Modified [place]    Balance Sitting:     Static:  Sup Dynamic: SBA  Standing: Min A  Sitting Supported:  Independent    Standing:  CGA/Hoa     Activity Tolerance F  Fair- G   Visual/  Perceptual Glasses: readers          Vitals WFL    WFL      Hand Dominance R    AROM (PROM) Strength Additional Info:    ARSENIODAVID  R shld. to 100  WFL distal 4/5 good  and wfl FMC/dexterity noted during ADL tasks      ALLY L shld. to 100  WFL distal 4/5 good  and wfl FMC/dexterity noted during ADL tasks         [facility]   Sensation:  No c/o numbness or tingling B UE  Tone: WFL B UE  Edema: none noted B UE       Education:  Pt was educated on role of OT, goals to be reached, importance of OOB activity, precautions to follow, safety and hand placement with transfers, safety/balance and walker management with functional mobility, and use of AE to assist with LB dressing tasks. Comments: Upon arrival pt seated on commode, agreeable to therapy. Pt completed of transfers, functional mobility and light ADL tasks this session. Pt stating wife able to assist with ADL tasks at home as needed. At end of session, pt seated upright in chair, all lines and tubes intact, call light within reach. Pt has made fair progress towards set goals.    Continue with current plan of care focusing on increasing of independency with transfers and ADL tasks      Treatment Time In: 8:45am             Treatment Time Out: 9:10am                Treatment Charges: Mins Units   Ther Ex  [id number]     Manual Therapy 48699     Thera Activities 79995     ADL/Home Mgt [id number] 25 2   Neuro Re-ed 07394     Group Therapy      Orthotic manage/training  74036     Non-Billable Time     Total Timed Treatment 25 2        Verito RICHARDSONA/L [id number]

## 2022-11-11 NOTE — CARE COORDINATION
11/11 Care Coordination. Pt underwent scheduled lumbar fusion of L3-S1 on 11/9. 1 Hemovac drain in place. LSO brace at bedside. PT/OT evaluated yesterday, 16 and 15/24, ambulated 50' x 2 with [address] Met with pt at bedside to discuss transition of care planning. Pt's PCP is Dr. Davina Thompson and he uses [address] in [place]. Pt lives with his wife in a 1 story home with 2 AGUSTIN with 1 hand rail. DME owned is a [employer] and SC. Hx of HHC with [facility]. No hx of CATHI. Plan on discharge is to return home. CM discussed HHC with pt, and he is agreeable. Inquired if pt wants to use [facility] again and he does. Referral called to Remy Faith. She will review. Await acceptance. [address] orders placed. Pt states his wife will provide transportation home. 1023  Received call from Remy Faith at [facility], she accepted pt. JOHANNY/destination completed. Pt and bedside RN notified of acceptance. SW/CM needs to contact Remy Faith at [facility] to inform of discharge so she can arrange [facility] day. DINORA FranksN, RN  [facility] Case Management   Cell: [phone]     The Plan for Transition of Care is related to the following treatment goals: [address]    The Patient was provided with a choice of provider and agrees with the discharge plan. [x] Yes [] No (agrees with plan, did not want list d/t history of using a particular HHC)    Freedom of choice list was provided with basic dialogue that supports the patient's individualized plan of care/goals, treatment preferences and shares the quality data associated with the providers.  [] Yes [x] No

## 2022-11-12 ENCOUNTER — APPOINTMENT (OUTPATIENT)
Dept: GENERAL RADIOLOGY | Age: 82
DRG: 457 | End: 2022-11-12
Attending: ORTHOPAEDIC SURGERY
Payer: MEDICARE

## 2022-11-12 PROCEDURE — 72082 X-RAY EXAM ENTIRE SPI 2/3 VW: CPT

## 2022-11-12 PROCEDURE — 2580000003 HC RX 258: Performed by: ORTHOPAEDIC SURGERY

## 2022-11-12 PROCEDURE — 6370000000 HC RX 637 (ALT 250 FOR IP): Performed by: ORTHOPAEDIC SURGERY

## 2022-11-12 PROCEDURE — 6360000002 HC RX W HCPCS: Performed by: ORTHOPAEDIC SURGERY

## 2022-11-12 PROCEDURE — 1200000000 HC SEMI PRIVATE

## 2022-11-12 PROCEDURE — 6370000000 HC RX 637 (ALT 250 FOR IP): Performed by: INTERNAL MEDICINE

## 2022-11-12 RX ORDER — ASPIRIN 81 MG/1
81 TABLET, CHEWABLE ORAL DAILY
Status: DISCONTINUED | OUTPATIENT
Start: 2022-11-12 | End: 2022-11-13 | Stop reason: HOSPADM

## 2022-11-12 RX ORDER — DEXAMETHASONE SODIUM PHOSPHATE 4 MG/ML
4 INJECTION, SOLUTION INTRA-ARTICULAR; INTRALESIONAL; INTRAMUSCULAR; INTRAVENOUS; SOFT TISSUE EVERY 6 HOURS
Status: COMPLETED | OUTPATIENT
Start: 2022-11-12 | End: 2022-11-12

## 2022-11-12 RX ADMIN — OXYCODONE 5 MG: 5 TABLET ORAL at 03:52

## 2022-11-12 RX ADMIN — LORAZEPAM 1 MG: 1 TABLET ORAL at 20:42

## 2022-11-12 RX ADMIN — Medication 3000 UNITS: at 09:16

## 2022-11-12 RX ADMIN — ALLOPURINOL 300 MG: 300 TABLET ORAL at 09:18

## 2022-11-12 RX ADMIN — OXYCODONE 5 MG: 5 TABLET ORAL at 09:16

## 2022-11-12 RX ADMIN — ACETAMINOPHEN 650 MG: 325 TABLET, FILM COATED ORAL at 09:17

## 2022-11-12 RX ADMIN — SODIUM CHLORIDE, PRESERVATIVE FREE 10 ML: 5 INJECTION INTRAVENOUS at 20:45

## 2022-11-12 RX ADMIN — GABAPENTIN 300 MG: 300 CAPSULE ORAL at 20:42

## 2022-11-12 RX ADMIN — CARVEDILOL 6.25 MG: 6.25 TABLET, FILM COATED ORAL at 09:17

## 2022-11-12 RX ADMIN — POLYETHYLENE GLYCOL 3350 17 G: 17 POWDER, FOR SOLUTION ORAL at 09:18

## 2022-11-12 RX ADMIN — ACETAMINOPHEN 650 MG: 325 TABLET, FILM COATED ORAL at 13:58

## 2022-11-12 RX ADMIN — GABAPENTIN 300 MG: 300 CAPSULE ORAL at 12:51

## 2022-11-12 RX ADMIN — TAMSULOSIN HYDROCHLORIDE 0.4 MG: 0.4 CAPSULE ORAL at 09:16

## 2022-11-12 RX ADMIN — GABAPENTIN 300 MG: 300 CAPSULE ORAL at 09:17

## 2022-11-12 RX ADMIN — SODIUM CHLORIDE, PRESERVATIVE FREE 10 ML: 5 INJECTION INTRAVENOUS at 20:46

## 2022-11-12 RX ADMIN — ASPIRIN 81 MG CHEWABLE TABLET 81 MG: 81 TABLET CHEWABLE at 12:51

## 2022-11-12 RX ADMIN — DEXAMETHASONE SODIUM PHOSPHATE 4 MG: 4 INJECTION, SOLUTION INTRA-ARTICULAR; INTRALESIONAL; INTRAMUSCULAR; INTRAVENOUS; SOFT TISSUE at 12:52

## 2022-11-12 RX ADMIN — ACETAMINOPHEN 650 MG: 325 TABLET, FILM COATED ORAL at 03:53

## 2022-11-12 RX ADMIN — DEXAMETHASONE SODIUM PHOSPHATE 4 MG: 4 INJECTION, SOLUTION INTRA-ARTICULAR; INTRALESIONAL; INTRAMUSCULAR; INTRAVENOUS; SOFT TISSUE at 23:01

## 2022-11-12 RX ADMIN — ATORVASTATIN CALCIUM 20 MG: 20 TABLET, FILM COATED ORAL at 17:09

## 2022-11-12 RX ADMIN — SODIUM CHLORIDE, PRESERVATIVE FREE 10 ML: 5 INJECTION INTRAVENOUS at 12:53

## 2022-11-12 RX ADMIN — OXYCODONE HYDROCHLORIDE 10 MG: 10 TABLET ORAL at 13:59

## 2022-11-12 RX ADMIN — CARVEDILOL 6.25 MG: 6.25 TABLET, FILM COATED ORAL at 17:09

## 2022-11-12 RX ADMIN — DEXAMETHASONE SODIUM PHOSPHATE 4 MG: 4 INJECTION, SOLUTION INTRA-ARTICULAR; INTRALESIONAL; INTRAMUSCULAR; INTRAVENOUS; SOFT TISSUE at 17:09

## 2022-11-12 RX ADMIN — LORAZEPAM 1 MG: 1 TABLET ORAL at 09:17

## 2022-11-12 RX ADMIN — ACETAMINOPHEN 650 MG: 325 TABLET, FILM COATED ORAL at 20:42

## 2022-11-12 ASSESSMENT — PAIN SCALES - GENERAL
PAINLEVEL_OUTOF10: 9
PAINLEVEL_OUTOF10: 4
PAINLEVEL_OUTOF10: 8
PAINLEVEL_OUTOF10: 6
PAINLEVEL_OUTOF10: 7
PAINLEVEL_OUTOF10: 0
PAINLEVEL_OUTOF10: 8

## 2022-11-12 ASSESSMENT — PAIN DESCRIPTION - LOCATION
LOCATION: BACK
LOCATION: BACK

## 2022-11-12 ASSESSMENT — PAIN - FUNCTIONAL ASSESSMENT
PAIN_FUNCTIONAL_ASSESSMENT: ACTIVITIES ARE NOT PREVENTED
PAIN_FUNCTIONAL_ASSESSMENT: ACTIVITIES ARE NOT PREVENTED

## 2022-11-12 ASSESSMENT — PAIN DESCRIPTION - ORIENTATION
ORIENTATION: LOWER
ORIENTATION: LOWER

## 2022-11-12 ASSESSMENT — PAIN DESCRIPTION - DESCRIPTORS
DESCRIPTORS: ACHING;DISCOMFORT;DULL
DESCRIPTORS: ACHING;DISCOMFORT;DULL

## 2022-11-12 NOTE — PROGRESS NOTES
Continues to do well  Some radicular pain today  Afebrile , vs stable  RRR  Lungs clear  Continue present measures

## 2022-11-12 NOTE — CONSULTS
[address]                  [address] [facility],  [facility]                                  CONSULTATION    PATIENT NAME: Jeevan Hussein                  :        1940  MED REC NO:   [id number]                            ROOM:       5204  ACCOUNT NO:   [de-identified]                           ADMIT DATE: 2022  PROVIDER:     Edda Martínez DO    CONSULT DATE:  11/10/2022    MEDICAL CONSULTATION    HISTORY OF PRESENT ILLNESS:  This patient is an 52-year-old male,  admitted for surgical repair of extensive lumbar spinal stenosis, who is  seen by me at this time postoperatively and medical followup and for  resumption of his medical treatments. The patient has a history of  coronary artery disease with several stents in . He has an anxiety  disorder, hyperlipidemia, cardiomyopathy, history of renal calculi, and  degenerative joint disease. At the time of my examination, he was alert  and oriented, doing well postoperatively. He denies chest pains,  dyspnea, or significant other complaint. PHYSICAL EXAMINATION:  GENERAL:  Examination demonstrated him to be alert and oriented. NECK:  Supple. Neck veins were flat. No jugular venous distention. No  carotid bruits. No palpable neck masses or lymphadenopathy. LUNGS:  Clear to auscultation bilaterally. CARDIOVASCULAR:  Reveals a regular rate and rhythm. ABDOMEN:  Soft and flat without masses or tenderness. EXTREMITIES:  No peripheral edema. NEUROLOGIC:  Normal neurologic examination. CLINICAL IMPRESSION:  1. Postop lumbar spinal surgery. 2.  Lumbar spinal stenosis. 3.  Coronary artery disease. 4.  Hyperlipidemia. 5.  Cardiomyopathy. 6.  Anxiety disorder. We will follow him medically.         Odin Sánchez DO    D: 2022 14:27:45       T: 2022 14:30:03     [email or web address]  Job#: [id number]     Doc#: [id number]    CC:

## 2022-11-12 NOTE — PROGRESS NOTES
Spine Progress Note      Sitting up in chair  C/O radiating pain Left L5 dermatome starting ~ 8am  Denies numbness or weakness  No other new complaints    A&O  Drain and dressing intact  Drain with 150cc output over 24hr.   Scant bloody drainage at present  Motor and sensory at baseline  No calf tenderness  1+ DP    A/P:  S/P Lumbar decompression/Fusion 11/9    Discontinue drain and begin daily dressing changes  Decadron for radicular symptoms  Restart daily ASA  Plan for home with home health tomorrow if continues to do well

## 2022-11-12 NOTE — PROGRESS NOTES
Physical Therapy    Date: 2022       Patient Name: Herve Cornelius  : 1940      MRN: [id number]    Physical therapy order received and chart reviewed. PT treatment attempted, pt is being transported off unit for imaging. Will follow up as appropriate.      Marita Chavez PT, DPT  [id number]

## 2022-11-13 VITALS
HEIGHT: 68 IN | WEIGHT: 174 LBS | DIASTOLIC BLOOD PRESSURE: 81 MMHG | BODY MASS INDEX: 26.37 KG/M2 | RESPIRATION RATE: 18 BRPM | TEMPERATURE: 98.6 F | HEART RATE: 82 BPM | OXYGEN SATURATION: 98 % | SYSTOLIC BLOOD PRESSURE: 143 MMHG

## 2022-11-13 PROCEDURE — 6370000000 HC RX 637 (ALT 250 FOR IP): Performed by: INTERNAL MEDICINE

## 2022-11-13 PROCEDURE — 6370000000 HC RX 637 (ALT 250 FOR IP): Performed by: ORTHOPAEDIC SURGERY

## 2022-11-13 RX ORDER — OXYCODONE HYDROCHLORIDE 5 MG/1
5 TABLET ORAL EVERY 6 HOURS PRN
Qty: 28 TABLET | Refills: 0 | Status: SHIPPED | OUTPATIENT
Start: 2022-11-13 | End: 2022-11-13 | Stop reason: HOSPADM

## 2022-11-13 RX ADMIN — ALLOPURINOL 300 MG: 300 TABLET ORAL at 08:54

## 2022-11-13 RX ADMIN — ASPIRIN 81 MG CHEWABLE TABLET 81 MG: 81 TABLET CHEWABLE at 08:54

## 2022-11-13 RX ADMIN — TAMSULOSIN HYDROCHLORIDE 0.4 MG: 0.4 CAPSULE ORAL at 08:53

## 2022-11-13 RX ADMIN — LORAZEPAM 1 MG: 1 TABLET ORAL at 08:54

## 2022-11-13 RX ADMIN — ACETAMINOPHEN 650 MG: 325 TABLET, FILM COATED ORAL at 08:54

## 2022-11-13 RX ADMIN — ACETAMINOPHEN 650 MG: 325 TABLET, FILM COATED ORAL at 04:04

## 2022-11-13 RX ADMIN — Medication 3000 UNITS: at 08:53

## 2022-11-13 RX ADMIN — POLYETHYLENE GLYCOL 3350 17 G: 17 POWDER, FOR SOLUTION ORAL at 08:53

## 2022-11-13 RX ADMIN — CARVEDILOL 6.25 MG: 6.25 TABLET, FILM COATED ORAL at 08:54

## 2022-11-13 RX ADMIN — GABAPENTIN 300 MG: 300 CAPSULE ORAL at 08:53

## 2022-11-13 ASSESSMENT — PAIN - FUNCTIONAL ASSESSMENT: PAIN_FUNCTIONAL_ASSESSMENT: ACTIVITIES ARE NOT PREVENTED

## 2022-11-13 ASSESSMENT — PAIN SCALES - GENERAL
PAINLEVEL_OUTOF10: 4
PAINLEVEL_OUTOF10: 4
PAINLEVEL_OUTOF10: 1

## 2022-11-13 ASSESSMENT — PAIN DESCRIPTION - DESCRIPTORS: DESCRIPTORS: ACHING;DISCOMFORT;DULL

## 2022-11-13 ASSESSMENT — PAIN DESCRIPTION - LOCATION
LOCATION: BACK
LOCATION: BACK

## 2022-11-13 ASSESSMENT — PAIN DESCRIPTION - ORIENTATION: ORIENTATION: LOWER

## 2022-11-13 NOTE — PROGRESS NOTES
Spine Progress Note      Doing well  Radicular pain L LE improved with decadron  No new complaints  Xrays completed    Drsg C/D/I. Incision intact and steri strips in place  Motor/Sensory at baseline. Continues 4/5 TA and EHL on Left  No calf tend  1+ DP    Spine Xrays reviewed. Hardware intact and appears in appropriate placement. Sagittal and coronal balance acceptable    A/P:  S/P Laminectomy and post inst fusion L3-S1    Likely home tomorrow with home health care.   [facility] to see Monday    Will order MAYRA graves at patient request

## 2022-11-13 NOTE — CARE COORDINATION
Discharge order noted, pt discharging home with hhc. [facility] had accepted pt, called and left message for [facility] of discharge and hhc orders. Marlin Miguel, MSW, LSW

## 2022-11-13 NOTE — DISCHARGE SUMMARY
Spine Discharge Note      Physician Discharge Summary     Patient ID:  Barbie Albarran  [id number]  84 y.o.  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 The patient underwent an uneventful course of Lumbar laminectomy and fusion by on 11/9/22. The patient was subsequently taken to the PACU and the floor in stable condition. The patient was started on pain control, DVT prophylaxis, antibiotics, and physical therapy as indicated. Blood counts were followed as needed. Discharge planning was performed and tailored in regards to patient progress, therapy progress, home needs, and support. Once the patient had made appropriate gains, they were able to be discharged. He was discharged to home with home health care in stable condition 11/13/22    Treatments: s/p Laminectomy and fusion L3-S1    Disposition: The patient was provided instructions to continue antibiotics, pain medication, DVT prophylaxis, Dressing changes as applicable. The patient was instructed on restrictions and activities. The patient was to follow up with Dieter Tripp DO, and to call the office to confirm appointment.     Prescription for Oxycodone 5mg, 1 Q6H prn, #28 sent electronically to [place], [place]      Signed:  Dieter Tripp DO  11/13/2022  9:56 AM

## 2022-11-13 NOTE — DISCHARGE INSTRUCTIONS
Okay to shower  Remove dressing. Leave Steri-strips (pieces of tape directly on incision). May shower over steri-strips/incision. Pat dry  gently and may replace new dry dressing. Okay to leave incision open to air if no drainage    Walk and rest as much as tolerated. No lifting more than a gallon of milk  Avoid any excessive bending/twisting  Riding in car will likely be uncomfortable early after surgery and try to limit this. It is okay to ride in car. It just may cause discomfort at incision. Do not drive until after first office visit in approximately 2 weeks.     Please call with any concerns

## 2023-09-19 ENCOUNTER — OFFICE VISIT (OUTPATIENT)
Dept: CARDIOLOGY CLINIC | Age: 83
End: 2023-09-19
Payer: MEDICARE

## 2023-09-19 VITALS
SYSTOLIC BLOOD PRESSURE: 120 MMHG | HEART RATE: 62 BPM | DIASTOLIC BLOOD PRESSURE: 70 MMHG | WEIGHT: 184.7 LBS | BODY MASS INDEX: 27.99 KG/M2 | HEIGHT: 68 IN | RESPIRATION RATE: 16 BRPM

## 2023-09-19 DIAGNOSIS — I25.10 CORONARY ARTERY DISEASE INVOLVING NATIVE CORONARY ARTERY OF NATIVE HEART WITHOUT ANGINA PECTORIS: Primary | ICD-10-CM

## 2023-09-19 DIAGNOSIS — E78.00 PURE HYPERCHOLESTEROLEMIA: ICD-10-CM

## 2023-09-19 DIAGNOSIS — I25.2 HISTORY OF MI (MYOCARDIAL INFARCTION): ICD-10-CM

## 2023-09-19 DIAGNOSIS — Z95.5 S/P PRIMARY ANGIOPLASTY WITH CORONARY STENT: ICD-10-CM

## 2023-09-19 DIAGNOSIS — I51.9 LEFT VENTRICULAR DYSFUNCTION: ICD-10-CM

## 2023-09-19 PROCEDURE — G8427 DOCREV CUR MEDS BY ELIG CLIN: HCPCS | Performed by: INTERNAL MEDICINE

## 2023-09-19 PROCEDURE — G8417 CALC BMI ABV UP PARAM F/U: HCPCS | Performed by: INTERNAL MEDICINE

## 2023-09-19 PROCEDURE — 99214 OFFICE O/P EST MOD 30 MIN: CPT | Performed by: INTERNAL MEDICINE

## 2023-09-19 PROCEDURE — 1123F ACP DISCUSS/DSCN MKR DOCD: CPT | Performed by: INTERNAL MEDICINE

## 2023-09-19 PROCEDURE — 1036F TOBACCO NON-USER: CPT | Performed by: INTERNAL MEDICINE

## 2023-09-19 PROCEDURE — 93000 ELECTROCARDIOGRAM COMPLETE: CPT | Performed by: INTERNAL MEDICINE

## 2023-09-19 NOTE — PROGRESS NOTES
General: No unusual weight gain, no change in exercise tolerance  Skin: No rash or itching  EENT: No vision changes or nosebleeds  Cardiovascular: No orthopnea or paroxysmal nocturnal dyspnea  Respiratory: No cough or hemoptysis  Gastrointestinal: No hematemesis or recent changes in bowel habits  Genitourinary: No hematuria, urgency or frequency  Musculoskeletal: No muscular weakness or joint swelling   Neurologic / Psychiatric: No incoordination or convulsions  Allergic / Immunologic/ Lymphatic / Endocrine: No anemia or bleeding tendency    Social History     Socioeconomic History    Marital status:      Spouse name: Not on file    Number of children: Not on file    Years of education: Not on file    Highest education level: Not on file   Occupational History    Not on file   Tobacco Use    Smoking status: Never    Smokeless tobacco: Never   Vaping Use    Vaping Use: Never used   Substance and Sexual Activity    Alcohol use: No    Drug use: No    Sexual activity: Not on file   Other Topics Concern    Not on file   Social History Narrative    Not on file     Social Determinants of Health     Financial Resource Strain: Not on file   Food Insecurity: Not on file   Transportation Needs: Not on file   Physical Activity: Not on file   Stress: Not on file   Social Connections: Not on file   Intimate Partner Violence: Not on file   Housing Stability: Not on file       Past Medical History:   Diagnosis Date    Acute deep vein thrombosis (DVT) of distal vein of right lower extremity ([address]) 7/14/2016    Acute MI, inferior wall ([address]) 9/3/08    Anxiety     Arthritis     osteo    CAD (coronary artery disease)     follows with Dr. Nelli Childers    Hip pain     Hyperlipidemia     Hypertension     Left ventricular dysfunction     Retention of urine, unspecified     Venous stasis of both lower extremities 7/14/2016       PHYSICAL EXAM:  CONSTITUTIONAL:  Well developed, well nourished    Vitals:    09/19/23 0823   BP: 120/70   Pulse:

## 2024-04-29 LAB
25(OH)D3 SERPL-MCNC: 34.1 NG/ML (ref 30–100)
ALBUMIN SERPL-MCNC: 4.1 G/DL (ref 3.5–5.2)
ALP SERPL-CCNC: 90 U/L (ref 40–129)
ALT SERPL-CCNC: 14 U/L (ref 0–40)
ANION GAP SERPL CALCULATED.3IONS-SCNC: 17 MMOL/L (ref 7–16)
AST SERPL-CCNC: 26 U/L (ref 0–39)
BILIRUB SERPL-MCNC: 0.7 MG/DL (ref 0–1.2)
BUN SERPL-MCNC: 22 MG/DL (ref 6–23)
CALCIUM SERPL-MCNC: 9.3 MG/DL (ref 8.6–10.2)
CHLORIDE SERPL-SCNC: 106 MMOL/L (ref 98–107)
CHOLEST SERPL-MCNC: 139 MG/DL
CO2 SERPL-SCNC: 19 MMOL/L (ref 22–29)
CREAT SERPL-MCNC: 1 MG/DL (ref 0.7–1.2)
GFR SERPL CREATININE-BSD FRML MDRD: 77 ML/MIN/1.73M2
GLUCOSE SERPL-MCNC: 90 MG/DL (ref 74–99)
HBA1C MFR BLD: 5.6 % (ref 4–5.6)
HDLC SERPL-MCNC: 50 MG/DL
LDLC SERPL CALC-MCNC: 74 MG/DL
POTASSIUM SERPL-SCNC: 4.3 MMOL/L (ref 3.5–5)
PROT SERPL-MCNC: 6.5 G/DL (ref 6.4–8.3)
PSA SERPL-MCNC: 14.79 NG/ML (ref 0–4)
SODIUM SERPL-SCNC: 142 MMOL/L (ref 132–146)
TRIGL SERPL-MCNC: 74 MG/DL
VLDLC SERPL CALC-MCNC: 15 MG/DL

## 2024-09-19 ENCOUNTER — OFFICE VISIT (OUTPATIENT)
Dept: CARDIOLOGY CLINIC | Age: 84
End: 2024-09-19
Payer: MEDICARE

## 2024-09-19 VITALS
DIASTOLIC BLOOD PRESSURE: 68 MMHG | HEIGHT: 68 IN | SYSTOLIC BLOOD PRESSURE: 120 MMHG | RESPIRATION RATE: 17 BRPM | BODY MASS INDEX: 27.68 KG/M2 | WEIGHT: 182.6 LBS | HEART RATE: 61 BPM

## 2024-09-19 DIAGNOSIS — R01.1 HEART MURMUR: ICD-10-CM

## 2024-09-19 DIAGNOSIS — I51.9 LEFT VENTRICULAR DYSFUNCTION: ICD-10-CM

## 2024-09-19 DIAGNOSIS — I25.10 CORONARY ARTERY DISEASE INVOLVING NATIVE CORONARY ARTERY OF NATIVE HEART WITHOUT ANGINA PECTORIS: Primary | ICD-10-CM

## 2024-09-19 DIAGNOSIS — E78.00 PURE HYPERCHOLESTEROLEMIA: ICD-10-CM

## 2024-09-19 PROCEDURE — 1036F TOBACCO NON-USER: CPT | Performed by: INTERNAL MEDICINE

## 2024-09-19 PROCEDURE — G8417 CALC BMI ABV UP PARAM F/U: HCPCS | Performed by: INTERNAL MEDICINE

## 2024-09-19 PROCEDURE — G8427 DOCREV CUR MEDS BY ELIG CLIN: HCPCS | Performed by: INTERNAL MEDICINE

## 2024-09-19 PROCEDURE — 93000 ELECTROCARDIOGRAM COMPLETE: CPT | Performed by: INTERNAL MEDICINE

## 2024-09-19 PROCEDURE — 99214 OFFICE O/P EST MOD 30 MIN: CPT | Performed by: INTERNAL MEDICINE

## 2024-09-19 PROCEDURE — 1123F ACP DISCUSS/DSCN MKR DOCD: CPT | Performed by: INTERNAL MEDICINE

## 2024-10-25 ENCOUNTER — TELEPHONE (OUTPATIENT)
Dept: CARDIOLOGY | Age: 84
End: 2024-10-25

## 2024-10-25 NOTE — TELEPHONE ENCOUNTER
CALLED PATIENT AND LM REMINDING THEM OF THEIR UPCOMING APPT DATE WITH US FOR THEIR ECHO    Electronically signed by Margaret Steinberg on 10/25/2024 at 11:45 AM

## 2024-10-28 ENCOUNTER — HOSPITAL ENCOUNTER (OUTPATIENT)
Dept: CARDIOLOGY | Age: 84
Discharge: HOME | End: 2024-10-30
Attending: INTERNAL MEDICINE
Payer: MEDICARE

## 2024-10-28 VITALS
DIASTOLIC BLOOD PRESSURE: 68 MMHG | WEIGHT: 182 LBS | HEIGHT: 68 IN | BODY MASS INDEX: 27.58 KG/M2 | SYSTOLIC BLOOD PRESSURE: 120 MMHG

## 2024-10-28 DIAGNOSIS — R01.1 HEART MURMUR: ICD-10-CM

## 2024-10-28 PROCEDURE — 93306 TTE W/DOPPLER COMPLETE: CPT

## 2024-10-29 LAB
ECHO AO ASC DIAM: 3.2 CM
ECHO AO ASCENDING AORTA INDEX: 1.63 CM/M2
ECHO AV AREA PEAK VELOCITY: 2.1 CM2
ECHO AV AREA VTI: 2 CM2
ECHO AV AREA/BSA PEAK VELOCITY: 1.1 CM2/M2
ECHO AV AREA/BSA VTI: 1 CM2/M2
ECHO AV CUSP MM: 1.8 CM
ECHO AV MEAN GRADIENT: 3 MMHG
ECHO AV MEAN VELOCITY: 0.8 M/S
ECHO AV PEAK GRADIENT: 4 MMHG
ECHO AV PEAK VELOCITY: 1 M/S
ECHO AV VELOCITY RATIO: 0.7
ECHO AV VTI: 20.6 CM
ECHO BSA: 1.99 M2
ECHO EST RA PRESSURE: 3 MMHG
ECHO LA DIAMETER INDEX: 1.99 CM/M2
ECHO LA DIAMETER: 3.9 CM
ECHO LA VOL A-L A2C: 55 ML (ref 18–58)
ECHO LA VOL A-L A4C: 75 ML (ref 18–58)
ECHO LA VOL MOD A2C: 53 ML (ref 18–58)
ECHO LA VOL MOD A4C: 68 ML (ref 18–58)
ECHO LA VOLUME AREA LENGTH: 71 ML
ECHO LA VOLUME INDEX A-L A2C: 28 ML/M2 (ref 16–34)
ECHO LA VOLUME INDEX A-L A4C: 38 ML/M2 (ref 16–34)
ECHO LA VOLUME INDEX AREA LENGTH: 36 ML/M2 (ref 16–34)
ECHO LA VOLUME INDEX MOD A2C: 27 ML/M2 (ref 16–34)
ECHO LA VOLUME INDEX MOD A4C: 35 ML/M2 (ref 16–34)
ECHO LV EDV A2C: 165 ML
ECHO LV EDV A4C: 243 ML
ECHO LV EDV BP: 211 ML (ref 67–155)
ECHO LV EDV INDEX A4C: 124 ML/M2
ECHO LV EDV INDEX BP: 108 ML/M2
ECHO LV EDV NDEX A2C: 84 ML/M2
ECHO LV EF PHYSICIAN: 40 %
ECHO LV EJECTION FRACTION A2C: 33 %
ECHO LV EJECTION FRACTION A4C: 46 %
ECHO LV EJECTION FRACTION BIPLANE: 40 % (ref 55–100)
ECHO LV ESV A2C: 110 ML
ECHO LV ESV A4C: 132 ML
ECHO LV ESV BP: 126 ML (ref 22–58)
ECHO LV ESV INDEX A2C: 56 ML/M2
ECHO LV ESV INDEX A4C: 67 ML/M2
ECHO LV ESV INDEX BP: 64 ML/M2
ECHO LV FRACTIONAL SHORTENING: 16 % (ref 28–44)
ECHO LV INTERNAL DIMENSION DIASTOLE INDEX: 2.86 CM/M2
ECHO LV INTERNAL DIMENSION DIASTOLIC: 5.6 CM (ref 4.2–5.9)
ECHO LV INTERNAL DIMENSION SYSTOLIC INDEX: 2.4 CM/M2
ECHO LV INTERNAL DIMENSION SYSTOLIC: 4.7 CM
ECHO LV ISOVOLUMETRIC RELAXATION TIME (IVRT): 110.7 MS
ECHO LV IVSD: 1.2 CM (ref 0.6–1)
ECHO LV MASS 2D: 280.5 G (ref 88–224)
ECHO LV MASS INDEX 2D: 143.1 G/M2 (ref 49–115)
ECHO LV POSTERIOR WALL DIASTOLIC: 1.2 CM (ref 0.6–1)
ECHO LV RELATIVE WALL THICKNESS RATIO: 0.43
ECHO LVOT AREA: 3.1 CM2
ECHO LVOT AV VTI INDEX: 0.66
ECHO LVOT DIAM: 2 CM
ECHO LVOT MEAN GRADIENT: 1 MMHG
ECHO LVOT PEAK GRADIENT: 2 MMHG
ECHO LVOT PEAK VELOCITY: 0.7 M/S
ECHO LVOT STROKE VOLUME INDEX: 21.6 ML/M2
ECHO LVOT SV: 42.4 ML
ECHO LVOT VTI: 13.5 CM
ECHO MV "A" WAVE DURATION: 133.8 MSEC
ECHO MV A VELOCITY: 0.82 M/S
ECHO MV AREA PHT: 5.1 CM2
ECHO MV AREA VTI: 2.2 CM2
ECHO MV E DECELERATION TIME (DT): 172.4 MS
ECHO MV E VELOCITY: 0.78 M/S
ECHO MV E/A RATIO: 0.95
ECHO MV EROA PISA: 0.2 CM2
ECHO MV LVOT VTI INDEX: 1.41
ECHO MV MAX VELOCITY: 0.9 M/S
ECHO MV MEAN GRADIENT: 1 MMHG
ECHO MV MEAN VELOCITY: 0.6 M/S
ECHO MV PEAK GRADIENT: 3 MMHG
ECHO MV PRESSURE HALF TIME (PHT): 43.3 MS
ECHO MV REGURGITANT ALIASING (NYQUIST) VELOCITY: 37 CM/S
ECHO MV REGURGITANT RADIUS PISA: 0.68 CM
ECHO MV REGURGITANT VELOCITY PISA: 4.9 M/S
ECHO MV REGURGITANT VOLUME PISA: 41.27 ML
ECHO MV REGURGITANT VTIA: 188.2 CM
ECHO MV VTI: 19.1 CM
ECHO PV MAX VELOCITY: 0.7 M/S
ECHO PV MEAN GRADIENT: 1 MMHG
ECHO PV MEAN VELOCITY: 0.5 M/S
ECHO PV PEAK GRADIENT: 2 MMHG
ECHO PV VTI: 13.5 CM
ECHO PVEIN PEAK D VELOCITY: 0.4 M/S
ECHO PVEIN PEAK S VELOCITY: 0.3 M/S
ECHO PVEIN S/D RATIO: 0.8
ECHO RIGHT VENTRICULAR SYSTOLIC PRESSURE (RVSP): 26 MMHG
ECHO RV INTERNAL DIMENSION: 2.8 CM
ECHO TV REGURGITANT MAX VELOCITY: 2.4 M/S
ECHO TV REGURGITANT PEAK GRADIENT: 23 MMHG

## 2024-10-30 ENCOUNTER — TELEPHONE (OUTPATIENT)
Dept: CARDIOLOGY CLINIC | Age: 84
End: 2024-10-30

## 2024-10-30 NOTE — TELEPHONE ENCOUNTER
----- Message from Dr. Fidel Mcclellan MD sent at 10/30/2024  3:36 PM EDT -----  Please let patient know that echo reveals some mild heart valve dysfunction.  However, no treatment required and no restrictions.

## 2025-01-20 ENCOUNTER — HOSPITAL ENCOUNTER (OUTPATIENT)
Dept: GENERAL RADIOLOGY | Age: 85
Discharge: HOME | End: 2025-01-22
Payer: MEDICARE

## 2025-01-20 ENCOUNTER — HOSPITAL ENCOUNTER (OUTPATIENT)
Age: 85
Discharge: HOME | End: 2025-01-22
Payer: MEDICARE

## 2025-01-20 DIAGNOSIS — I50.40 HEART FAILURE DUE TO VALVULAR DISEASE, UNSPECIFIED FAILURE CHRONICITY, COMBINED (HCC): ICD-10-CM

## 2025-01-20 DIAGNOSIS — I38 HEART FAILURE DUE TO VALVULAR DISEASE, UNSPECIFIED FAILURE CHRONICITY, COMBINED (HCC): ICD-10-CM

## 2025-01-20 PROCEDURE — 71046 X-RAY EXAM CHEST 2 VIEWS: CPT

## 2025-01-27 ENCOUNTER — TELEPHONE (OUTPATIENT)
Dept: CARDIOLOGY CLINIC | Age: 85
End: 2025-01-27

## 2025-01-27 NOTE — TELEPHONE ENCOUNTER
Patient called and stated PCP wants seen for possible CHF.      Labs are scanned in under media and x-ray in in epic     Please advise

## 2025-02-12 ENCOUNTER — OFFICE VISIT (OUTPATIENT)
Dept: CARDIOLOGY CLINIC | Age: 85
End: 2025-02-12
Payer: MEDICARE

## 2025-02-12 VITALS
TEMPERATURE: 97.3 F | HEIGHT: 68 IN | BODY MASS INDEX: 27.04 KG/M2 | SYSTOLIC BLOOD PRESSURE: 116 MMHG | WEIGHT: 178.4 LBS | OXYGEN SATURATION: 89 % | RESPIRATION RATE: 18 BRPM | DIASTOLIC BLOOD PRESSURE: 68 MMHG | HEART RATE: 71 BPM

## 2025-02-12 DIAGNOSIS — I34.0 NONRHEUMATIC MITRAL VALVE REGURGITATION: ICD-10-CM

## 2025-02-12 DIAGNOSIS — I25.10 CORONARY ARTERY DISEASE INVOLVING NATIVE CORONARY ARTERY OF NATIVE HEART WITHOUT ANGINA PECTORIS: ICD-10-CM

## 2025-02-12 DIAGNOSIS — I25.2 HISTORY OF MI (MYOCARDIAL INFARCTION): ICD-10-CM

## 2025-02-12 DIAGNOSIS — R79.89 ELEVATED BRAIN NATRIURETIC PEPTIDE (BNP) LEVEL: Primary | ICD-10-CM

## 2025-02-12 DIAGNOSIS — E78.00 PURE HYPERCHOLESTEROLEMIA: ICD-10-CM

## 2025-02-12 DIAGNOSIS — Z95.5 S/P PRIMARY ANGIOPLASTY WITH CORONARY STENT: ICD-10-CM

## 2025-02-12 DIAGNOSIS — I51.9 LEFT VENTRICULAR DYSFUNCTION: ICD-10-CM

## 2025-02-12 PROCEDURE — 1160F RVW MEDS BY RX/DR IN RCRD: CPT | Performed by: INTERNAL MEDICINE

## 2025-02-12 PROCEDURE — G8417 CALC BMI ABV UP PARAM F/U: HCPCS | Performed by: INTERNAL MEDICINE

## 2025-02-12 PROCEDURE — 1159F MED LIST DOCD IN RCRD: CPT | Performed by: INTERNAL MEDICINE

## 2025-02-12 PROCEDURE — 99214 OFFICE O/P EST MOD 30 MIN: CPT | Performed by: INTERNAL MEDICINE

## 2025-02-12 PROCEDURE — 1036F TOBACCO NON-USER: CPT | Performed by: INTERNAL MEDICINE

## 2025-02-12 PROCEDURE — 1123F ACP DISCUSS/DSCN MKR DOCD: CPT | Performed by: INTERNAL MEDICINE

## 2025-02-12 PROCEDURE — G8427 DOCREV CUR MEDS BY ELIG CLIN: HCPCS | Performed by: INTERNAL MEDICINE

## 2025-02-12 PROCEDURE — 93000 ELECTROCARDIOGRAM COMPLETE: CPT | Performed by: INTERNAL MEDICINE

## 2025-02-12 RX ORDER — SACUBITRIL AND VALSARTAN 24; 26 MG/1; MG/1
1 TABLET, FILM COATED ORAL 2 TIMES DAILY
COMMUNITY
End: 2025-02-12 | Stop reason: SDUPTHER

## 2025-02-12 RX ORDER — FUROSEMIDE 20 MG/1
20 TABLET ORAL DAILY
COMMUNITY
Start: 2025-01-20

## 2025-02-12 RX ORDER — SACUBITRIL AND VALSARTAN 24; 26 MG/1; MG/1
1 TABLET, FILM COATED ORAL 2 TIMES DAILY
Qty: 60 TABLET | Refills: 5 | Status: SHIPPED | OUTPATIENT
Start: 2025-02-12

## 2025-02-12 NOTE — PROGRESS NOTES
Patient Active Problem List   Diagnosis    History of MI (myocardial infarction) - acute inferior wall    CAD (coronary artery disease)    S/P primary angioplasty with coronary stent - RCA     Left ventricular dysfunction    Hyperlipidemia    Acute deep vein thrombosis (DVT) of distal vein of right lower extremity (HCC)    Venous stasis of both lower extremities    Spinal stenosis, lumbar region, with neurogenic claudication       Current Outpatient Medications   Medication Sig Dispense Refill    furosemide (LASIX) 20 MG tablet Take 1 tablet by mouth daily      sacubitril-valsartan (ENTRESTO) 24-26 MG per tablet Take 1 tablet by mouth 2 times daily 60 tablet 5    LORazepam (ATIVAN) 1 MG tablet Take 1 tablet by mouth every 6 hours as needed.      atorvastatin (LIPITOR) 20 MG tablet Take 1 tablet by mouth every evening      aspirin 81 MG tablet Take 1 tablet by mouth daily      allopurinol (ZYLOPRIM) 300 MG tablet Take 1 tablet by mouth daily      Cholecalciferol (VITAMIN D3) 75 MCG (3000 UT) TABS Take 3,000 Units by mouth daily       carvedilol (COREG) 6.25 MG tablet Take 1 tablet by mouth daily       No current facility-administered medications for this visit.       CC:    Patient is seen in follow up for:  1. Elevated brain natriuretic peptide (BNP) level    2. Coronary artery disease involving native coronary artery of native heart without angina pectoris - hx of MI/stent    3. Left ventricular dysfunction - EF 45%    4. Pure hypercholesterolemia    5. History of MI (myocardial infarction) - acute inferior wall    6. S/P primary angioplasty with coronary stent - RCA         HPI:  Seen for 4 new onset congestive heart failure.  Chest x-ray with mild edema and elevated BNP.  Was started on Lasix and relates significant resolution in his shortness of breath and peripheral edema.    ROS:   General: No unusual weight gain, (+) change in exercise tolerance  Skin: No rash or itching  EENT: No vision changes or

## 2025-05-14 ENCOUNTER — OFFICE VISIT (OUTPATIENT)
Dept: CARDIOLOGY CLINIC | Age: 85
End: 2025-05-14
Payer: MEDICARE

## 2025-05-14 VITALS
BODY MASS INDEX: 28.6 KG/M2 | OXYGEN SATURATION: 99 % | TEMPERATURE: 97.3 F | DIASTOLIC BLOOD PRESSURE: 62 MMHG | HEIGHT: 68 IN | RESPIRATION RATE: 18 BRPM | SYSTOLIC BLOOD PRESSURE: 108 MMHG | WEIGHT: 188.7 LBS | HEART RATE: 77 BPM

## 2025-05-14 DIAGNOSIS — E78.00 PURE HYPERCHOLESTEROLEMIA: ICD-10-CM

## 2025-05-14 DIAGNOSIS — Z95.5 S/P PRIMARY ANGIOPLASTY WITH CORONARY STENT: ICD-10-CM

## 2025-05-14 DIAGNOSIS — I25.10 CORONARY ARTERY DISEASE INVOLVING NATIVE CORONARY ARTERY OF NATIVE HEART WITHOUT ANGINA PECTORIS: ICD-10-CM

## 2025-05-14 DIAGNOSIS — I51.9 LEFT VENTRICULAR DYSFUNCTION: ICD-10-CM

## 2025-05-14 DIAGNOSIS — R79.89 ELEVATED BRAIN NATRIURETIC PEPTIDE (BNP) LEVEL: Primary | ICD-10-CM

## 2025-05-14 DIAGNOSIS — I25.2 HISTORY OF MI (MYOCARDIAL INFARCTION): ICD-10-CM

## 2025-05-14 DIAGNOSIS — I34.0 NONRHEUMATIC MITRAL VALVE REGURGITATION: ICD-10-CM

## 2025-05-14 PROCEDURE — G8427 DOCREV CUR MEDS BY ELIG CLIN: HCPCS | Performed by: INTERNAL MEDICINE

## 2025-05-14 PROCEDURE — 93000 ELECTROCARDIOGRAM COMPLETE: CPT | Performed by: INTERNAL MEDICINE

## 2025-05-14 PROCEDURE — 1160F RVW MEDS BY RX/DR IN RCRD: CPT | Performed by: INTERNAL MEDICINE

## 2025-05-14 PROCEDURE — G8417 CALC BMI ABV UP PARAM F/U: HCPCS | Performed by: INTERNAL MEDICINE

## 2025-05-14 PROCEDURE — 1159F MED LIST DOCD IN RCRD: CPT | Performed by: INTERNAL MEDICINE

## 2025-05-14 PROCEDURE — 1123F ACP DISCUSS/DSCN MKR DOCD: CPT | Performed by: INTERNAL MEDICINE

## 2025-05-14 PROCEDURE — 1036F TOBACCO NON-USER: CPT | Performed by: INTERNAL MEDICINE

## 2025-05-14 PROCEDURE — 99214 OFFICE O/P EST MOD 30 MIN: CPT | Performed by: INTERNAL MEDICINE

## 2025-05-14 RX ORDER — GABAPENTIN 100 MG/1
100 CAPSULE ORAL 3 TIMES DAILY
COMMUNITY

## 2025-05-14 NOTE — PROGRESS NOTES
dysfunction     Retention of urine, unspecified     Venous stasis of both lower extremities 7/14/2016       PHYSICAL EXAM:  CONSTITUTIONAL:  Well developed, well nourished    Vitals:    05/14/25 0830   BP: 108/62   Pulse: 77   Resp: 18   Temp: 97.3 °F (36.3 °C)   SpO2: 99%   Weight: 85.6 kg (188 lb 11.2 oz)   Height: 1.727 m (5' 8\")     HEAD & FACE: Normocephalic. Symmetric.  EYES: No xanthelasma.  Conjunctivae not injected.  EARS, NOSE, MOUTH & THROAT: No oral pallor or cyanosis.    NECK: No JVD at 30 degrees.  No thyromegaly.  RESPIRATORY: Clear to auscultation and percussion in all fields.  No use of accessory muscle or intercostal retractions.   CARDIOVASCULAR: Regular rate and rhythm.  No lifts or thrills on palpitation.  Auscultation with paradoxical S2 splitting.  No carotid bruits.  Abdominal aorta not enlarged.  Femoral arteries without bruits.  Pedal pulses 2+.  Bilateral varicosities.  1+ bilateral ankle edema.    ABDOMEN: Soft without hepatic or splenic enlargement.  No tenderness.    MUSCULOSKELETAL: No kyphosis or scoliosis of the back.  Good muscle strength and tone.  No muscle atrophy.  Slow gait and inability to undergo exercise stress testing.  EXTREMITIES: No clubbing or cyanosis.    SKIN: No Xanthomas or ulcerations.  NEUROLOGIC: Oriented to time, place and person.  Normal mood and affect.    LYMPHATIC:  No palpable neck or supraclavicular nodes.  No splenomegaly.     EKG: the EKG tracing was reviewed and found to reveal: Normal sinus rhythm.  PVCs.  Left bundle branch block.  QTc 476 ms.  No change compared to prior tracing.        Assessment & Plan  Elevated brain natriuretic peptide (BNP) level   Chronic, at goal (stable), continue current treatment plan         Coronary artery disease involving native coronary artery of native heart without angina pectoris - hx of MI/stent   Chronic, at goal (stable), continue current treatment plan    Orders:    EKG 12 lead    Left ventricular dysfunction -

## 2025-07-02 ENCOUNTER — OFFICE VISIT (OUTPATIENT)
Dept: PODIATRY | Age: 85
End: 2025-07-02
Payer: MEDICARE

## 2025-07-02 VITALS — WEIGHT: 188 LBS | BODY MASS INDEX: 28.59 KG/M2

## 2025-07-02 DIAGNOSIS — G60.8 HEREDITARY SENSORY NEUROPATHY: ICD-10-CM

## 2025-07-02 DIAGNOSIS — B35.1 TINEA UNGUIUM: Primary | ICD-10-CM

## 2025-07-02 PROCEDURE — 1123F ACP DISCUSS/DSCN MKR DOCD: CPT | Performed by: PODIATRIST

## 2025-07-02 PROCEDURE — 11721 DEBRIDE NAIL 6 OR MORE: CPT | Performed by: PODIATRIST

## 2025-07-02 PROCEDURE — 99203 OFFICE O/P NEW LOW 30 MIN: CPT | Performed by: PODIATRIST

## 2025-07-02 PROCEDURE — G8427 DOCREV CUR MEDS BY ELIG CLIN: HCPCS | Performed by: PODIATRIST

## 2025-07-02 PROCEDURE — 1159F MED LIST DOCD IN RCRD: CPT | Performed by: PODIATRIST

## 2025-07-02 PROCEDURE — 1036F TOBACCO NON-USER: CPT | Performed by: PODIATRIST

## 2025-07-02 PROCEDURE — G8417 CALC BMI ABV UP PARAM F/U: HCPCS | Performed by: PODIATRIST

## 2025-07-02 NOTE — PROGRESS NOTES
of right lower extremity (HCC) 7/14/2016    Acute MI, inferior wall (HCC) 9/3/08    Anxiety     Arthritis     osteo    CAD (coronary artery disease)     follows with Dr. omer    Hip pain     Hyperlipidemia     Hypertension     Left ventricular dysfunction     Retention of urine, unspecified     Venous stasis of both lower extremities 7/14/2016     There were no vitals filed for this visit.     Work History/Social History:   Foot and ankle history:     Focused Lower Extremity Physical Exam:    Neurovascular examination:    Dorsalis Pedis palpable bilateral.  Posterior tibialis palpable bilateral.    Capillary Refill Time:  Immediate return  Hair growth:  Symmetrical and bilateral   Skin:  Not atrophic  Edema: Mild edema bilateral feet.   Mild edema bilateral ankles.  Neurologic:  Light touch diminished bilateral.  Warm to coolness bilateral distal toes  Decreased epicritic sensation    Musculoskeletal/ Orthopedic examination:    Equinis: present bilateral  Dorsiflexion, plantarflexion, inversion, eversion bilateral 5 out of 5 muscle strength  Wiggling toes  Negative Homans  No pain foot or ankle    Dermatology examination:    Toenails 1 through 5 bilateral thickened, elongated, dystrophic, mycotic with subungual debris.  Web spaces 1 through 4 bilateral clean dry and intact.  No significant hyperkeratotic tissue foot or ankle  Dry skin plantar heels      Assessment and Plan:  Rosas was seen today for nail problem and callouses.    Diagnoses and all orders for this visit:    Tinea unguium    Hereditary sensory neuropathy    Other orders  -     ammonium lactate (LAC-HYDRIN) 12 % lotion; Apply topically twice daily          New referral clinical tinea unguium    Dry skin both heels.  Did recommend ammonium lactate 12% twice daily.  Formal debridement toenails 1 through 5 right and left with manual debridement for thickness and overall length.  Avoid barefoot.  Wife present throughout entire visit.  Follow-up 2

## 2025-07-03 RX ORDER — AMMONIUM LACTATE 12 G/100G
LOTION TOPICAL
Qty: 400 G | Refills: 2 | Status: SHIPPED | OUTPATIENT
Start: 2025-07-03

## 2025-08-04 RX ORDER — SACUBITRIL AND VALSARTAN 24; 26 MG/1; MG/1
1 TABLET, FILM COATED ORAL 2 TIMES DAILY
Qty: 180 TABLET | Refills: 3 | Status: SHIPPED | OUTPATIENT
Start: 2025-08-04

## (undated) DEVICE — SPHERE EYE 1 MRK GUIDANCE PASS STEALTHSTATION 1PK/EA

## (undated) DEVICE — E-Z CLEAN, NON-STICK, PTFE COATED, ELECTROSURGICAL BLADE ELECTRODE, 6.5 INCH (16.5 CM): Brand: MEGADYNE

## (undated) DEVICE — BAG TRNSF 300ML CLR PVC BLD DISP TERUFLEX

## (undated) DEVICE — 3M™ IOBAN™ 2 ANTIMICROBIAL INCISE DRAPE 6650EZ: Brand: IOBAN™ 2

## (undated) DEVICE — SOLUTION IRRIG 1000ML STRL H2O USP PLAS POUR BTL

## (undated) DEVICE — SET PROC W/ 250ML BOWL 30UM FLTR RESVR BRAT 2

## (undated) DEVICE — GAUZE,SPONGE,4"X4",16PLY,XRAY,STRL,LF: Brand: MEDLINE

## (undated) DEVICE — BLADE CLIPPER GEN PURP NS

## (undated) DEVICE — AGENT HEMSTAT 3GM PURIFIED PLNT STARCH PWD ABSRB ARISTA AH

## (undated) DEVICE — WILSON FRAME KIT: Brand: MEDLINE INDUSTRIES, INC.

## (undated) DEVICE — BOWL MED L 32OZ PLAS W/ MOLD GRAD EZ OPN PEEL PCH

## (undated) DEVICE — SYRINGE 20ML LL S/C 50

## (undated) DEVICE — C-ARMOR C-ARM EQUIPMENT COVERS CLEAR STERILE UNIVERSAL FIT 12 PER CASE: Brand: C-ARMOR

## (undated) DEVICE — 3M™ TEGADERM™ TRANSPARENT FILM DRESSING FRAME STYLE, 1626W, 4 IN X 4-3/4 IN (10 CM X 12 CM), 50/CT 4CT/CASE: Brand: 3M™ TEGADERM™

## (undated) DEVICE — MARKER SURG PASS SPHR NDI

## (undated) DEVICE — SOLUTION IRRIG 1000ML 0.9% SOD CHL USP POUR PLAS BTL

## (undated) DEVICE — SHEET,DRAPE,53X77,STERILE: Brand: MEDLINE

## (undated) DEVICE — GLOVE SURG SZ 85 L12IN FNGR THK13MIL WHT ISOLEX POLYISOPRENE

## (undated) DEVICE — STRIP,CLOSURE,WOUND,MEDI-STRIP,1/2X4: Brand: MEDLINE

## (undated) DEVICE — BLOOD TRANSFUSION FILTER: Brand: HAEMONETICS

## (undated) DEVICE — ELECTRODE PT RET AD L9FT HI MOIST COND ADH HYDRGEL CORDED

## (undated) DEVICE — NEEDLE SPNL L3.5IN PNK HUB S STL REG WALL FIT STYL W/ QNCKE

## (undated) DEVICE — THE MILL DISPOSABLE - FINE

## (undated) DEVICE — LUMBAR LAMINECTOMY: Brand: MEDLINE INDUSTRIES, INC.

## (undated) DEVICE — 5.5MM EGG

## (undated) DEVICE — KIT EVAC 400CC DIA1/8IN H PAT 12.5IN 3 SPR RND SHP PVC DRN

## (undated) DEVICE — SHEET,DRAPE,40X58,STERILE: Brand: MEDLINE

## (undated) DEVICE — TUBING SUCT 12FR MAL ALUM SHFT FN CAP VENT UNIV CONN W/ OBT

## (undated) DEVICE — GOWN,BREATHABLE SLV,AURORA,XLG,STRL: Brand: MEDLINE